# Patient Record
Sex: FEMALE | Race: WHITE | NOT HISPANIC OR LATINO | Employment: FULL TIME | URBAN - METROPOLITAN AREA
[De-identification: names, ages, dates, MRNs, and addresses within clinical notes are randomized per-mention and may not be internally consistent; named-entity substitution may affect disease eponyms.]

---

## 2018-03-22 ENCOUNTER — APPOINTMENT (OUTPATIENT)
Dept: WOMENS IMAGING | Facility: HOSPITAL | Age: 58
End: 2018-03-22

## 2018-03-22 PROCEDURE — 77067 SCR MAMMO BI INCL CAD: CPT | Performed by: RADIOLOGY

## 2018-03-22 PROCEDURE — 77063 BREAST TOMOSYNTHESIS BI: CPT | Performed by: RADIOLOGY

## 2020-03-11 ENCOUNTER — APPOINTMENT (OUTPATIENT)
Dept: WOMENS IMAGING | Facility: HOSPITAL | Age: 60
End: 2020-03-11

## 2020-03-11 PROCEDURE — 77067 SCR MAMMO BI INCL CAD: CPT | Performed by: RADIOLOGY

## 2020-03-11 PROCEDURE — 77063 BREAST TOMOSYNTHESIS BI: CPT | Performed by: RADIOLOGY

## 2021-03-03 ENCOUNTER — OFFICE VISIT (OUTPATIENT)
Dept: NEUROSURGERY | Facility: CLINIC | Age: 61
End: 2021-03-03

## 2021-03-03 VITALS — HEIGHT: 67 IN | WEIGHT: 212 LBS | BODY MASS INDEX: 33.27 KG/M2 | TEMPERATURE: 97.3 F

## 2021-03-03 DIAGNOSIS — M51.36 DDD (DEGENERATIVE DISC DISEASE), LUMBAR: ICD-10-CM

## 2021-03-03 DIAGNOSIS — M51.16 LUMBAR DISC HERNIATION WITH RADICULOPATHY: Primary | ICD-10-CM

## 2021-03-03 PROCEDURE — 99204 OFFICE O/P NEW MOD 45 MIN: CPT | Performed by: NEUROLOGICAL SURGERY

## 2021-03-03 RX ORDER — INDOMETHACIN 50 MG/1
50 CAPSULE ORAL 3 TIMES DAILY PRN
COMMUNITY
Start: 2021-02-20 | End: 2021-04-21

## 2021-03-03 RX ORDER — METOPROLOL SUCCINATE 100 MG/1
100 TABLET, EXTENDED RELEASE ORAL DAILY
COMMUNITY
Start: 2021-02-15

## 2021-03-03 RX ORDER — ORPHENADRINE CITRATE 100 MG/1
100 TABLET, EXTENDED RELEASE ORAL 2 TIMES DAILY
COMMUNITY
Start: 2021-02-04

## 2021-03-03 RX ORDER — ASCORBIC ACID 1000 MG
1 TABLET, EXTENDED RELEASE ORAL DAILY
Status: ON HOLD | COMMUNITY
End: 2021-04-01 | Stop reason: SDUPTHER

## 2021-03-03 RX ORDER — ATORVASTATIN CALCIUM 20 MG/1
20 TABLET, FILM COATED ORAL NIGHTLY
COMMUNITY
Start: 2021-02-15

## 2021-03-03 RX ORDER — GABAPENTIN 300 MG/1
CAPSULE ORAL
Qty: 90 CAPSULE | Refills: 0 | Status: SHIPPED | OUTPATIENT
Start: 2021-03-03 | End: 2021-04-21

## 2021-03-03 NOTE — PROGRESS NOTES
Patient: Nain Hutchins  : 1960    Primary Care Provider: Karl Velazquez MD    Requesting Provider: As above        History    Chief Complaint: Back and right leg pain with sensory alteration.    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  She did well.  At the very beginning of this year she developed increased low back pain radiating to her right hip and buttock and then down the leg.  The pain extends into the side and even a little bit into the more anterior aspects of the right leg.  She has no left leg symptoms.  She does have associated numbness and tingling.  Symptoms are better with heat and ice.  She has to frequently change positions to get comfortable.  She does a little better with walking.  She is worse with sitting or standing still.  She has been treated with a cortisone injections in her hip as well as oral steroids.  She has done a home exercise regimen.  She has no bowel or bladder dysfunction.  Of note at the end of December I operated on her daughter's back.    Review of Systems   Constitutional: Positive for activity change. Negative for appetite change, chills, diaphoresis, fatigue, fever and unexpected weight change.   HENT: Negative for congestion, dental problem, drooling, ear discharge, ear pain, facial swelling, hearing loss, mouth sores, nosebleeds, postnasal drip, rhinorrhea, sinus pressure, sinus pain, sneezing, sore throat, tinnitus, trouble swallowing and voice change.    Eyes: Negative for photophobia, pain, discharge, redness, itching and visual disturbance.   Respiratory: Positive for apnea. Negative for cough, choking, chest tightness, shortness of breath, wheezing and stridor.    Cardiovascular: Negative for chest pain, palpitations and leg swelling.   Gastrointestinal: Negative for abdominal distention, abdominal pain, anal bleeding, blood in stool,  "constipation, diarrhea, nausea, rectal pain and vomiting.   Endocrine: Negative for cold intolerance, heat intolerance, polydipsia, polyphagia and polyuria.   Genitourinary: Negative for decreased urine volume, difficulty urinating, dyspareunia, dysuria, enuresis, flank pain, frequency, genital sores, hematuria, menstrual problem, pelvic pain, urgency, vaginal bleeding, vaginal discharge and vaginal pain.   Musculoskeletal: Positive for back pain. Negative for arthralgias, gait problem, joint swelling, myalgias, neck pain and neck stiffness.   Skin: Negative for color change, pallor, rash and wound.   Allergic/Immunologic: Negative for environmental allergies, food allergies and immunocompromised state.   Neurological: Negative for dizziness, tremors, seizures, syncope, facial asymmetry, speech difficulty, weakness, light-headedness, numbness and headaches.   Hematological: Negative for adenopathy. Does not bruise/bleed easily.   Psychiatric/Behavioral: Negative for agitation, behavioral problems, confusion, decreased concentration, dysphoric mood, hallucinations, self-injury, sleep disturbance and suicidal ideas. The patient is not nervous/anxious and is not hyperactive.        The patient's past medical history, past surgical history, family history, and social history have been reviewed at length in the electronic medical record.    Physical Exam:   Temp 97.3 °F (36.3 °C)   Ht 170.2 cm (67\")   Wt 96.2 kg (212 lb)   BMI 33.20 kg/m²   CONSTITUTIONAL: Patient is well-nourished, pleasant and appears stated age.  CV: Heart regular rate and rhythm without murmur, rub, or gallop.  PULMONARY: Lungs are clear to ascultation.  MUSCULOSKELETAL:  Straight leg raising is negative.  Micky's Sign is negative.  ROM in back normal.  Tenderness in the back to palpation is not observed.  NEUROLOGICAL:  Orientation, memory, attention span, language function, and cognition have been examined and are intact.  Strength is intact " in the lower extremities to direct testing.  Muscle tone is normal throughout.  Station and gait are normal.  Sensation is intact to light touch testing throughout.  Deep tendon reflexes are 2+ and symmetrical except the right ankle reflex which is absent  Coordination is intact.      Medical Decision Making    Data Review:   MRI of the lumbar spine dated 2/10/2021 demonstrates some diffuse degenerative disc disease.  There is disc extrusion into the recess and proximal foramen on the right at L4-5 that is the likely source for her symptoms.  Laminotomy defect with some granulation tissue is noted on the right at L5-S1.    Diagnosis:   Right L4/5 radiculopathy secondary to disc herniation.    Treatment Options:   The patient is awaiting a call to set up an epidural injection with Dr. Osborn.  I am going to start her on physical therapy and Neurontin.  She will follow-up with me in approximately 2.5 weeks.  If she is not doing better then we will push ahead with right L4-5 discectomy.  I have explained what that procedure would entail as well as the potential risks, complications, limitations.  If her symptoms worsen in the interim then she will contact me and we will push ahead with surgery.  She is to be off work until follow-up.  Her  was present via speaker phone for our discussion.       Diagnosis Plan   1. Lumbar disc herniation with radiculopathy  Ambulatory Referral to Physical Therapy Evaluate and treat; Stretching, ROM, Strengthening    gabapentin (NEURONTIN) 300 MG capsule   2. DDD (degenerative disc disease), lumbar         Scribed for Kit Castelan MD by LAURI Light 3/3/2021 14:46 EST      I, Dr. Castelan, personally performed the services described in the documentation, as scribed in my presence, and it is both accurate and complete.

## 2021-03-05 ENCOUNTER — TELEPHONE (OUTPATIENT)
Dept: NEUROSURGERY | Facility: CLINIC | Age: 61
End: 2021-03-05

## 2021-03-05 NOTE — TELEPHONE ENCOUNTER
Yes, it is very common to be taking an NSAID, muscle relaxer and gabapentin all at one.   - each one achieves something different

## 2021-03-05 NOTE — TELEPHONE ENCOUNTER
Provider:  Kit Castelan  Caller: Nain cerda  Time of call:8:55    Phone #:  [phone]  Surgery: na   Surgery Date: na   Last visit: 3-3-21    Next visit: 3-24-21    MARIE:         Reason for call:    Patient wanted to know if she should be taking the indomethacin with the orphenadrine together while she is taking the gabapentin. Please call her back. This is for her back pain .

## 2021-03-24 ENCOUNTER — OFFICE VISIT (OUTPATIENT)
Dept: NEUROSURGERY | Facility: CLINIC | Age: 61
End: 2021-03-24

## 2021-03-24 VITALS — TEMPERATURE: 97 F | RESPIRATION RATE: 15 BRPM | HEIGHT: 67 IN | BODY MASS INDEX: 33.9 KG/M2 | WEIGHT: 216 LBS

## 2021-03-24 DIAGNOSIS — M51.36 DDD (DEGENERATIVE DISC DISEASE), LUMBAR: ICD-10-CM

## 2021-03-24 DIAGNOSIS — M51.16 LUMBAR DISC HERNIATION WITH RADICULOPATHY: Primary | ICD-10-CM

## 2021-03-24 PROCEDURE — 99214 OFFICE O/P EST MOD 30 MIN: CPT | Performed by: NEUROLOGICAL SURGERY

## 2021-03-24 RX ORDER — HYDROCODONE BITARTRATE AND ACETAMINOPHEN 5; 325 MG/1; MG/1
1 TABLET ORAL EVERY 6 HOURS PRN
COMMUNITY
Start: 2021-02-24 | End: 2021-03-24

## 2021-03-24 NOTE — PROGRESS NOTES
Patient: Nain Hutchins  : 1960    Primary Care Provider: Karl Velazquez MD    Requesting Provider: As above        History    Chief Complaint: Back and right leg pain with sensory alteration.    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  She did well.  At the very beginning of this year she developed increased low back pain radiating to her right hip and buttock and then down the leg.  The pain extends into the side and even a little bit into the more anterior aspects of the right leg.  She has no left leg symptoms.  She does have associated numbness and tingling.  Symptoms are better with heat and ice.  She has to frequently change positions to get comfortable.  She does a little better with walking.  She is worse with sitting or standing still.  She has been treated with a cortisone injections in her hip as well as oral steroids.  She has done a home exercise regimen.  She has no bowel or bladder dysfunction.  Of note at the end of December I operated on her daughter's back.  She has done some more therapy and it made her symptoms worse.  She has not had an epidural injection performed as of yet.    Review of Systems   Constitutional: Negative for activity change, appetite change, chills, diaphoresis, fatigue, fever and unexpected weight change.   HENT: Negative for congestion, dental problem, drooling, ear discharge, ear pain, facial swelling, hearing loss, mouth sores, nosebleeds, postnasal drip, rhinorrhea, sinus pressure, sneezing, sore throat, tinnitus, trouble swallowing and voice change.    Eyes: Negative for photophobia, pain, discharge, redness, itching and visual disturbance.   Respiratory: Negative for apnea, cough, choking, chest tightness, shortness of breath, wheezing and stridor.    Cardiovascular: Negative for chest pain, palpitations and leg swelling.   Gastrointestinal:  "Negative for abdominal distention, abdominal pain, anal bleeding, blood in stool, constipation, diarrhea, nausea, rectal pain and vomiting.   Endocrine: Negative for cold intolerance, heat intolerance, polydipsia, polyphagia and polyuria.   Genitourinary: Negative for decreased urine volume, difficulty urinating, dysuria, enuresis, flank pain, frequency, genital sores, hematuria and urgency.   Musculoskeletal: Negative for arthralgias, back pain, gait problem, joint swelling, myalgias, neck pain and neck stiffness.   Skin: Negative for color change, pallor, rash and wound.   Allergic/Immunologic: Negative for environmental allergies, food allergies and immunocompromised state.   Neurological: Negative for dizziness, tremors, seizures, syncope, facial asymmetry, speech difficulty, weakness, light-headedness, numbness and headaches.   Hematological: Negative for adenopathy. Does not bruise/bleed easily.   Psychiatric/Behavioral: Negative for agitation, behavioral problems, confusion, decreased concentration, dysphoric mood, hallucinations, self-injury, sleep disturbance and suicidal ideas. The patient is not nervous/anxious and is not hyperactive.    All other systems reviewed and are negative.      The patient's past medical history, past surgical history, family history, and social history have been reviewed at length in the electronic medical record.    Physical Exam:   Temp 97 °F (36.1 °C) (Infrared)   Resp 15   Ht 170.2 cm (67\")   Wt 98 kg (216 lb)   BMI 33.83 kg/m²   MUSCULOSKELETAL:  Straight leg raising is negative.  Micky's Sign is negative.  ROM in back is normal.  Tenderness in the back to palpation is not observed.  NEUROLOGICAL:  Strength is intact in the lower extremities to direct testing.  Muscle tone is normal throughout.  Station and gait are normal.  Sensation is intact to light touch testing throughout.        Medical Decision Making    Data Review:   MRI of the lumbar spine dated 2/10/2021 " demonstrates some diffuse degenerative disc disease.  There is disc extrusion into the recess and proximal foramen on the right at L4-5 that is the likely source for her symptoms.  Laminotomy defect with some granulation tissue is noted on the right at L5-S1.  There is some central to left-sided prominent disc bulging at L3-4.    Diagnosis:   Right L4-5 radiculopathy secondary to disc protrusion.    Treatment Options:   The patient is struggling and as such I recommended right L4-5 discectomy.  The nature of the procedure as well as the potential risks, complications, limitations, and alternatives to the procedure were discussed at length with the patient and the patient has agreed to proceed with surgery.       Diagnosis Plan   1. Lumbar disc herniation with radiculopathy     2. DDD (degenerative disc disease), lumbar         Scribed for Kit Castelan MD by Darlene Mooney CMA on 3/24/2021 14:37 EDT       I, Dr. Castelan, personally performed the services described in the documentation, as scribed in my presence, and it is both accurate and complete.

## 2021-03-25 ENCOUNTER — PREP FOR SURGERY (OUTPATIENT)
Dept: OTHER | Facility: HOSPITAL | Age: 61
End: 2021-03-25

## 2021-03-25 DIAGNOSIS — M54.31 SCIATICA OF RIGHT SIDE: Primary | ICD-10-CM

## 2021-03-25 RX ORDER — SODIUM CHLORIDE, SODIUM LACTATE, POTASSIUM CHLORIDE, CALCIUM CHLORIDE 600; 310; 30; 20 MG/100ML; MG/100ML; MG/100ML; MG/100ML
9 INJECTION, SOLUTION INTRAVENOUS CONTINUOUS
Status: CANCELLED | OUTPATIENT
Start: 2021-03-25

## 2021-03-25 RX ORDER — CEFAZOLIN SODIUM 2 G/100ML
2 INJECTION, SOLUTION INTRAVENOUS ONCE
Status: CANCELLED | OUTPATIENT
Start: 2021-03-25 | End: 2021-03-25

## 2021-03-25 RX ORDER — CHLORHEXIDINE GLUCONATE 4 G/100ML
SOLUTION TOPICAL
Qty: 120 ML | Refills: 0 | Status: ON HOLD | OUTPATIENT
Start: 2021-03-25 | End: 2021-04-01 | Stop reason: SDUPTHER

## 2021-03-25 RX ORDER — FAMOTIDINE 20 MG/1
20 TABLET, FILM COATED ORAL
Status: CANCELLED | OUTPATIENT
Start: 2021-03-25

## 2021-03-30 ENCOUNTER — APPOINTMENT (OUTPATIENT)
Dept: PREADMISSION TESTING | Facility: HOSPITAL | Age: 61
End: 2021-03-30

## 2021-03-30 VITALS — WEIGHT: 216.27 LBS | BODY MASS INDEX: 32.03 KG/M2 | HEIGHT: 69 IN

## 2021-03-30 DIAGNOSIS — M54.31 SCIATICA OF RIGHT SIDE: ICD-10-CM

## 2021-03-30 LAB
ANION GAP SERPL CALCULATED.3IONS-SCNC: 10 MMOL/L (ref 5–15)
BUN SERPL-MCNC: 14 MG/DL (ref 6–20)
BUN/CREAT SERPL: 16.3 (ref 7–25)
CALCIUM SPEC-SCNC: 9.5 MG/DL (ref 8.6–10.5)
CHLORIDE SERPL-SCNC: 106 MMOL/L (ref 98–107)
CO2 SERPL-SCNC: 26 MMOL/L (ref 22–29)
CREAT SERPL-MCNC: 0.86 MG/DL (ref 0.57–1)
DEPRECATED RDW RBC AUTO: 45.6 FL (ref 37–54)
ERYTHROCYTE [DISTWIDTH] IN BLOOD BY AUTOMATED COUNT: 15.4 % (ref 12.3–15.4)
GFR SERPL CREATININE-BSD FRML MDRD: 68 ML/MIN/1.73
GLUCOSE SERPL-MCNC: 95 MG/DL (ref 65–99)
HCT VFR BLD AUTO: 39.6 % (ref 34–46.6)
HGB BLD-MCNC: 12.2 G/DL (ref 12–15.9)
MCH RBC QN AUTO: 25.2 PG (ref 26.6–33)
MCHC RBC AUTO-ENTMCNC: 30.8 G/DL (ref 31.5–35.7)
MCV RBC AUTO: 81.8 FL (ref 79–97)
MRSA DNA SPEC QL NAA+PROBE: NEGATIVE
PLATELET # BLD AUTO: 312 10*3/MM3 (ref 140–450)
PMV BLD AUTO: 10.1 FL (ref 6–12)
POTASSIUM SERPL-SCNC: 5.1 MMOL/L (ref 3.5–5.2)
QT INTERVAL: 404 MS
QTC INTERVAL: 429 MS
RBC # BLD AUTO: 4.84 10*6/MM3 (ref 3.77–5.28)
SODIUM SERPL-SCNC: 142 MMOL/L (ref 136–145)
WBC # BLD AUTO: 6.88 10*3/MM3 (ref 3.4–10.8)

## 2021-03-30 PROCEDURE — U0004 COV-19 TEST NON-CDC HGH THRU: HCPCS

## 2021-03-30 PROCEDURE — 93005 ELECTROCARDIOGRAM TRACING: CPT

## 2021-03-30 PROCEDURE — 87641 MR-STAPH DNA AMP PROBE: CPT

## 2021-03-30 PROCEDURE — 36415 COLL VENOUS BLD VENIPUNCTURE: CPT

## 2021-03-30 PROCEDURE — 85027 COMPLETE CBC AUTOMATED: CPT

## 2021-03-30 PROCEDURE — C9803 HOPD COVID-19 SPEC COLLECT: HCPCS

## 2021-03-30 PROCEDURE — 80048 BASIC METABOLIC PNL TOTAL CA: CPT

## 2021-03-30 PROCEDURE — 93010 ELECTROCARDIOGRAM REPORT: CPT | Performed by: INTERNAL MEDICINE

## 2021-03-31 ENCOUNTER — ANESTHESIA EVENT (OUTPATIENT)
Dept: PERIOP | Facility: HOSPITAL | Age: 61
End: 2021-03-31

## 2021-03-31 LAB
REF LAB TEST METHOD: NORMAL
SARS-COV-2 RNA RESP QL NAA+PROBE: NOT DETECTED

## 2021-03-31 RX ORDER — FAMOTIDINE 10 MG/ML
20 INJECTION, SOLUTION INTRAVENOUS ONCE
Status: CANCELLED | OUTPATIENT
Start: 2021-03-31 | End: 2021-03-31

## 2021-04-01 ENCOUNTER — HOSPITAL ENCOUNTER (OUTPATIENT)
Facility: HOSPITAL | Age: 61
Setting detail: HOSPITAL OUTPATIENT SURGERY
Discharge: HOME | End: 2021-04-01
Attending: NEUROLOGICAL SURGERY | Admitting: NEUROLOGICAL SURGERY

## 2021-04-01 ENCOUNTER — ANESTHESIA (OUTPATIENT)
Dept: PERIOP | Facility: HOSPITAL | Age: 61
End: 2021-04-01

## 2021-04-01 ENCOUNTER — APPOINTMENT (OUTPATIENT)
Dept: GENERAL RADIOLOGY | Facility: HOSPITAL | Age: 61
End: 2021-04-01

## 2021-04-01 VITALS
SYSTOLIC BLOOD PRESSURE: 164 MMHG | WEIGHT: 216 LBS | HEART RATE: 79 BPM | BODY MASS INDEX: 31.99 KG/M2 | RESPIRATION RATE: 20 BRPM | OXYGEN SATURATION: 93 % | TEMPERATURE: 98.5 F | HEIGHT: 69 IN | DIASTOLIC BLOOD PRESSURE: 68 MMHG

## 2021-04-01 DIAGNOSIS — M54.31 SCIATICA OF RIGHT SIDE: Primary | ICD-10-CM

## 2021-04-01 LAB — GLUCOSE BLDC GLUCOMTR-MCNC: 109 MG/DL (ref 70–130)

## 2021-04-01 PROCEDURE — 25010000002 DEXAMETHASONE PER 1 MG: Performed by: NURSE ANESTHETIST, CERTIFIED REGISTERED

## 2021-04-01 PROCEDURE — 25010000002 FENTANYL CITRATE (PF) 100 MCG/2ML SOLUTION: Performed by: ANESTHESIOLOGY

## 2021-04-01 PROCEDURE — 25010000002 NEOSTIGMINE 10 MG/10ML SOLUTION: Performed by: NURSE ANESTHETIST, CERTIFIED REGISTERED

## 2021-04-01 PROCEDURE — 25010000002 ONDANSETRON PER 1 MG: Performed by: NURSE ANESTHETIST, CERTIFIED REGISTERED

## 2021-04-01 PROCEDURE — 25010000002 FENTANYL CITRATE (PF) 100 MCG/2ML SOLUTION: Performed by: NURSE ANESTHETIST, CERTIFIED REGISTERED

## 2021-04-01 PROCEDURE — 25010000002 PROPOFOL 10 MG/ML EMULSION: Performed by: NURSE ANESTHETIST, CERTIFIED REGISTERED

## 2021-04-01 PROCEDURE — 25010000003 CEFAZOLIN IN DEXTROSE 2-4 GM/100ML-% SOLUTION: Performed by: PHYSICIAN ASSISTANT

## 2021-04-01 PROCEDURE — 63030 LAMOT DCMPRN NRV RT 1 LMBR: CPT | Performed by: NEUROLOGICAL SURGERY

## 2021-04-01 PROCEDURE — 82962 GLUCOSE BLOOD TEST: CPT

## 2021-04-01 PROCEDURE — 76000 FLUOROSCOPY <1 HR PHYS/QHP: CPT

## 2021-04-01 PROCEDURE — 63030 LAMOT DCMPRN NRV RT 1 LMBR: CPT | Performed by: PHYSICIAN ASSISTANT

## 2021-04-01 PROCEDURE — 25010000002 METHYLPREDNISOLONE PER 40 MG: Performed by: NEUROLOGICAL SURGERY

## 2021-04-01 DEVICE — FLOSEAL HEMOSTATIC MATRIX, 10ML
Type: IMPLANTABLE DEVICE | Site: SPINE LUMBAR | Status: FUNCTIONAL
Brand: FLOSEAL HEMOSTATIC MATRIX

## 2021-04-01 DEVICE — IMPLANTABLE DEVICE
Type: IMPLANTABLE DEVICE | Site: SPINE LUMBAR | Status: FUNCTIONAL
Brand: SURGIFOAM® ABSORBABLE GELATIN SPONGE, U.S.P.

## 2021-04-01 RX ORDER — SODIUM CHLORIDE 0.9 % (FLUSH) 0.9 %
10 SYRINGE (ML) INJECTION AS NEEDED
Status: DISCONTINUED | OUTPATIENT
Start: 2021-04-01 | End: 2021-04-01 | Stop reason: HOSPADM

## 2021-04-01 RX ORDER — MULTIVIT WITH MINERALS/LUTEIN
1000 TABLET ORAL DAILY
COMMUNITY

## 2021-04-01 RX ORDER — DROPERIDOL 2.5 MG/ML
0.62 INJECTION, SOLUTION INTRAMUSCULAR; INTRAVENOUS ONCE AS NEEDED
Status: DISCONTINUED | OUTPATIENT
Start: 2021-04-01 | End: 2021-04-01 | Stop reason: HOSPADM

## 2021-04-01 RX ORDER — ROCURONIUM BROMIDE 10 MG/ML
INJECTION, SOLUTION INTRAVENOUS AS NEEDED
Status: DISCONTINUED | OUTPATIENT
Start: 2021-04-01 | End: 2021-04-01 | Stop reason: SURG

## 2021-04-01 RX ORDER — MIDAZOLAM HYDROCHLORIDE 1 MG/ML
2 INJECTION INTRAMUSCULAR; INTRAVENOUS
Status: DISCONTINUED | OUTPATIENT
Start: 2021-04-01 | End: 2021-04-01 | Stop reason: HOSPADM

## 2021-04-01 RX ORDER — ONDANSETRON 2 MG/ML
INJECTION INTRAMUSCULAR; INTRAVENOUS AS NEEDED
Status: DISCONTINUED | OUTPATIENT
Start: 2021-04-01 | End: 2021-04-01 | Stop reason: SURG

## 2021-04-01 RX ORDER — ONDANSETRON 2 MG/ML
4 INJECTION INTRAMUSCULAR; INTRAVENOUS ONCE AS NEEDED
Status: DISCONTINUED | OUTPATIENT
Start: 2021-04-01 | End: 2021-04-01 | Stop reason: HOSPADM

## 2021-04-01 RX ORDER — PROMETHAZINE HYDROCHLORIDE 25 MG/1
25 TABLET ORAL ONCE AS NEEDED
Status: DISCONTINUED | OUTPATIENT
Start: 2021-04-01 | End: 2021-04-01 | Stop reason: HOSPADM

## 2021-04-01 RX ORDER — IPRATROPIUM BROMIDE AND ALBUTEROL SULFATE 2.5; .5 MG/3ML; MG/3ML
3 SOLUTION RESPIRATORY (INHALATION) ONCE AS NEEDED
Status: DISCONTINUED | OUTPATIENT
Start: 2021-04-01 | End: 2021-04-01 | Stop reason: HOSPADM

## 2021-04-01 RX ORDER — FAMOTIDINE 20 MG/1
20 TABLET, FILM COATED ORAL ONCE
Status: COMPLETED | OUTPATIENT
Start: 2021-04-01 | End: 2021-04-01

## 2021-04-01 RX ORDER — PROPOFOL 10 MG/ML
VIAL (ML) INTRAVENOUS AS NEEDED
Status: DISCONTINUED | OUTPATIENT
Start: 2021-04-01 | End: 2021-04-01 | Stop reason: SURG

## 2021-04-01 RX ORDER — METHYLPREDNISOLONE ACETATE 40 MG/ML
INJECTION, SUSPENSION INTRA-ARTICULAR; INTRALESIONAL; INTRAMUSCULAR; SOFT TISSUE AS NEEDED
Status: DISCONTINUED | OUTPATIENT
Start: 2021-04-01 | End: 2021-04-01 | Stop reason: HOSPADM

## 2021-04-01 RX ORDER — HYDROMORPHONE HYDROCHLORIDE 1 MG/ML
0.5 INJECTION, SOLUTION INTRAMUSCULAR; INTRAVENOUS; SUBCUTANEOUS
Status: DISCONTINUED | OUTPATIENT
Start: 2021-04-01 | End: 2021-04-01 | Stop reason: HOSPADM

## 2021-04-01 RX ORDER — MAGNESIUM HYDROXIDE 1200 MG/15ML
LIQUID ORAL AS NEEDED
Status: DISCONTINUED | OUTPATIENT
Start: 2021-04-01 | End: 2021-04-01 | Stop reason: HOSPADM

## 2021-04-01 RX ORDER — FENTANYL CITRATE 50 UG/ML
50 INJECTION, SOLUTION INTRAMUSCULAR; INTRAVENOUS
Status: DISCONTINUED | OUTPATIENT
Start: 2021-04-01 | End: 2021-04-01 | Stop reason: HOSPADM

## 2021-04-01 RX ORDER — GLYCOPYRROLATE 0.2 MG/ML
INJECTION INTRAMUSCULAR; INTRAVENOUS AS NEEDED
Status: DISCONTINUED | OUTPATIENT
Start: 2021-04-01 | End: 2021-04-01 | Stop reason: SURG

## 2021-04-01 RX ORDER — LABETALOL HYDROCHLORIDE 5 MG/ML
5 INJECTION, SOLUTION INTRAVENOUS
Status: DISCONTINUED | OUTPATIENT
Start: 2021-04-01 | End: 2021-04-01 | Stop reason: HOSPADM

## 2021-04-01 RX ORDER — OXYCODONE HYDROCHLORIDE AND ACETAMINOPHEN 5; 325 MG/1; MG/1
1 TABLET ORAL 3 TIMES DAILY PRN
Qty: 20 TABLET | Refills: 0 | Status: SHIPPED | OUTPATIENT
Start: 2021-04-01 | End: 2021-04-21

## 2021-04-01 RX ORDER — SCOLOPAMINE TRANSDERMAL SYSTEM 1 MG/1
1 PATCH, EXTENDED RELEASE TRANSDERMAL ONCE
Status: DISCONTINUED | OUTPATIENT
Start: 2021-04-01 | End: 2021-04-01 | Stop reason: HOSPADM

## 2021-04-01 RX ORDER — FENTANYL CITRATE 50 UG/ML
100 INJECTION, SOLUTION INTRAMUSCULAR; INTRAVENOUS
Status: DISCONTINUED | OUTPATIENT
Start: 2021-04-01 | End: 2021-04-01 | Stop reason: HOSPADM

## 2021-04-01 RX ORDER — DEXAMETHASONE SODIUM PHOSPHATE 4 MG/ML
INJECTION, SOLUTION INTRA-ARTICULAR; INTRALESIONAL; INTRAMUSCULAR; INTRAVENOUS; SOFT TISSUE AS NEEDED
Status: DISCONTINUED | OUTPATIENT
Start: 2021-04-01 | End: 2021-04-01 | Stop reason: SURG

## 2021-04-01 RX ORDER — NEOSTIGMINE METHYLSULFATE 1 MG/ML
INJECTION, SOLUTION INTRAVENOUS AS NEEDED
Status: DISCONTINUED | OUTPATIENT
Start: 2021-04-01 | End: 2021-04-01 | Stop reason: SURG

## 2021-04-01 RX ORDER — CEFAZOLIN SODIUM 2 G/100ML
2 INJECTION, SOLUTION INTRAVENOUS ONCE
Status: COMPLETED | OUTPATIENT
Start: 2021-04-01 | End: 2021-04-01

## 2021-04-01 RX ORDER — SODIUM CHLORIDE, SODIUM LACTATE, POTASSIUM CHLORIDE, CALCIUM CHLORIDE 600; 310; 30; 20 MG/100ML; MG/100ML; MG/100ML; MG/100ML
9 INJECTION, SOLUTION INTRAVENOUS CONTINUOUS
Status: DISCONTINUED | OUTPATIENT
Start: 2021-04-01 | End: 2021-04-01 | Stop reason: HOSPADM

## 2021-04-01 RX ORDER — BUPIVACAINE HYDROCHLORIDE AND EPINEPHRINE 2.5; 5 MG/ML; UG/ML
INJECTION, SOLUTION EPIDURAL; INFILTRATION; INTRACAUDAL; PERINEURAL AS NEEDED
Status: DISCONTINUED | OUTPATIENT
Start: 2021-04-01 | End: 2021-04-01 | Stop reason: HOSPADM

## 2021-04-01 RX ORDER — PROMETHAZINE HYDROCHLORIDE 25 MG/1
25 SUPPOSITORY RECTAL ONCE AS NEEDED
Status: DISCONTINUED | OUTPATIENT
Start: 2021-04-01 | End: 2021-04-01 | Stop reason: HOSPADM

## 2021-04-01 RX ORDER — SODIUM CHLORIDE 0.9 % (FLUSH) 0.9 %
10 SYRINGE (ML) INJECTION EVERY 12 HOURS SCHEDULED
Status: DISCONTINUED | OUTPATIENT
Start: 2021-04-01 | End: 2021-04-01 | Stop reason: HOSPADM

## 2021-04-01 RX ORDER — LIDOCAINE HYDROCHLORIDE 10 MG/ML
0.5 INJECTION, SOLUTION EPIDURAL; INFILTRATION; INTRACAUDAL; PERINEURAL ONCE AS NEEDED
Status: COMPLETED | OUTPATIENT
Start: 2021-04-01 | End: 2021-04-01

## 2021-04-01 RX ORDER — FENTANYL CITRATE 50 UG/ML
INJECTION, SOLUTION INTRAMUSCULAR; INTRAVENOUS AS NEEDED
Status: DISCONTINUED | OUTPATIENT
Start: 2021-04-01 | End: 2021-04-01 | Stop reason: SURG

## 2021-04-01 RX ORDER — MIDAZOLAM HYDROCHLORIDE 1 MG/ML
1 INJECTION INTRAMUSCULAR; INTRAVENOUS
Status: DISCONTINUED | OUTPATIENT
Start: 2021-04-01 | End: 2021-04-01 | Stop reason: HOSPADM

## 2021-04-01 RX ORDER — LIDOCAINE HYDROCHLORIDE 10 MG/ML
INJECTION, SOLUTION EPIDURAL; INFILTRATION; INTRACAUDAL; PERINEURAL AS NEEDED
Status: DISCONTINUED | OUTPATIENT
Start: 2021-04-01 | End: 2021-04-01 | Stop reason: SURG

## 2021-04-01 RX ORDER — METOPROLOL SUCCINATE 50 MG/1
100 TABLET, EXTENDED RELEASE ORAL ONCE
Status: COMPLETED | OUTPATIENT
Start: 2021-04-01 | End: 2021-04-01

## 2021-04-01 RX ADMIN — SODIUM CHLORIDE, POTASSIUM CHLORIDE, SODIUM LACTATE AND CALCIUM CHLORIDE 9 ML/HR: 600; 310; 30; 20 INJECTION, SOLUTION INTRAVENOUS at 10:36

## 2021-04-01 RX ADMIN — GLYCOPYRROLATE 0.4 MG: 0.4 INJECTION INTRAMUSCULAR; INTRAVENOUS at 13:38

## 2021-04-01 RX ADMIN — LIDOCAINE HYDROCHLORIDE 0.2 ML: 10 INJECTION, SOLUTION EPIDURAL; INFILTRATION; INTRACAUDAL; PERINEURAL at 10:36

## 2021-04-01 RX ADMIN — PROPOFOL 200 MG: 10 INJECTION, EMULSION INTRAVENOUS at 12:46

## 2021-04-01 RX ADMIN — METOPROLOL SUCCINATE 100 MG: 50 TABLET, EXTENDED RELEASE ORAL at 11:02

## 2021-04-01 RX ADMIN — SCOPALAMINE 1 PATCH: 1 PATCH, EXTENDED RELEASE TRANSDERMAL at 11:01

## 2021-04-01 RX ADMIN — FENTANYL CITRATE 50 MCG: 50 INJECTION, SOLUTION INTRAMUSCULAR; INTRAVENOUS at 12:46

## 2021-04-01 RX ADMIN — NEOSTIGMINE 3 MG: 1 INJECTION INTRAVENOUS at 13:38

## 2021-04-01 RX ADMIN — FENTANYL CITRATE 100 MCG: 50 INJECTION, SOLUTION INTRAMUSCULAR; INTRAVENOUS at 12:28

## 2021-04-01 RX ADMIN — CEFAZOLIN SODIUM 2 G: 2 INJECTION, SOLUTION INTRAVENOUS at 12:40

## 2021-04-01 RX ADMIN — FENTANYL CITRATE 50 MCG: 50 INJECTION, SOLUTION INTRAMUSCULAR; INTRAVENOUS at 13:41

## 2021-04-01 RX ADMIN — FAMOTIDINE 20 MG: 20 TABLET, FILM COATED ORAL at 11:01

## 2021-04-01 RX ADMIN — ONDANSETRON 4 MG: 2 INJECTION INTRAMUSCULAR; INTRAVENOUS at 13:38

## 2021-04-01 RX ADMIN — DEXAMETHASONE SODIUM PHOSPHATE 8 MG: 4 INJECTION, SOLUTION INTRA-ARTICULAR; INTRALESIONAL; INTRAMUSCULAR; INTRAVENOUS; SOFT TISSUE at 12:51

## 2021-04-01 RX ADMIN — ROCURONIUM BROMIDE 40 MG: 10 INJECTION INTRAVENOUS at 12:46

## 2021-04-01 RX ADMIN — LIDOCAINE HYDROCHLORIDE 50 MG: 10 INJECTION, SOLUTION EPIDURAL; INFILTRATION; INTRACAUDAL; PERINEURAL at 12:46

## 2021-04-01 NOTE — ANESTHESIA POSTPROCEDURE EVALUATION
Patient: Nain Hutchins    Procedure Summary     Date: 04/01/21 Room / Location:  [facility]  [facility]    Anesthesia Start: 1240 Anesthesia Stop: 1352    Procedure: LUMBAR DISCECTOMY L4-5 (Right Spine Lumbar) Diagnosis:       Sciatica of right side      (Sciatica of right side [M54.31])    Surgeons: Kit Castelan MD Provider: Cale Santoyo MD    Anesthesia Type: general ASA Status: 3          Anesthesia Type: general    Vitals  Vitals Value Taken Time   /76 04/01/21 1352   Temp 98.8 °F (37.1 °C) 04/01/21 1352   Pulse 91 04/01/21 1352   Resp 14 04/01/21 1352   SpO2 92 % 04/01/21 1352           Post Anesthesia Care and Evaluation    Patient location during evaluation: PACU  Patient participation: complete - patient participated  Level of consciousness: awake and alert  Pain management: adequate  Airway patency: patent  Anesthetic complications: No anesthetic complications  PONV Status: none  Cardiovascular status: hemodynamically stable and acceptable  Respiratory status: nonlabored ventilation, acceptable and nasal cannula  Hydration status: acceptable

## 2021-04-01 NOTE — INTERVAL H&P NOTE
"[facility] Pre-op    Full history and physical note from office is attached.    /86 (BP Location: Right arm, Patient Position: Lying)   Pulse 78   Temp 96.6 °F (35.9 °C) (Temporal)   Resp 18   Ht 175.3 cm (69\")   Wt 98 kg (216 lb)   SpO2 94%   BMI 31.90 kg/m²     Immunizations:  Influenza:  No  Pneumococcal:  No  Tetanus:  No  Covid : no    LAB Results:  Lab Results   Component Value Date    WBC 6.88 03/30/2021    HGB 12.2 03/30/2021    HCT 39.6 03/30/2021    MCV 81.8 03/30/2021     03/30/2021    GLUCOSE 95 03/30/2021    BUN 14 03/30/2021    CREATININE 0.86 03/30/2021    EGFRIFNONA 68 03/30/2021     03/30/2021    K 5.1 03/30/2021     03/30/2021    CO2 26.0 03/30/2021    CALCIUM 9.5 03/30/2021       Cancer Staging (if applicable)  Cancer Patient: __ yes __no __unknown__N/A; If yes, clinical stage T:__ N:__M:__, stage group or __N/A      Impression: Sciatica of right side      Plan: LUMBAR DISCECTOMY L4-5      TALIB Castro   4/1/2021   11:14 EDT   "

## 2021-04-01 NOTE — OP NOTE
NEUROSURGICAL OPERATIVE NOTE        PREOPERATIVE DIAGNOSIS:    Right L4-5 disc herniation      POSTOPERATIVE DIAGNOSIS:  Right L4-5 disc herniation with recess and foraminal stenosis      PROCEDURE:  Right L4-5 laminotomy, medial facetectomy, foraminotomy, and discectomy      SURGEON:  Kit Castelan M.D.      ASSISTANT: Rupali Dubose PA-C    PAC assisted with:   Suctioning   Retraction   Tying   Suturing   Closing   Application of dressing   Skilled neurosurgery PA assistance was necessary to perform this procedure.        ANESTHESIA:  General      ESTIMATED BLOOD LOSS: Minimal      SPECIMEN: None      DRAINS: None      COMPLICATIONS:  None      CLINICAL NOTE:  The patient is a 60-year-old woman with a history of severe back and right leg pain that has been unremitting.  Studies demonstrate disc protrusion rightward at L4-5 that provides clinical correlation.  As such, the patient presents at this time for lumbar discectomy.  The nature of the procedure as well as the potential risks, complications, limitations, and alternatives to the procedure were discussed at length with the patient and the patient has agreed to proceed with surgery.      TECHNICAL NOTE:  The patient was brought to the operating room and while on her cart general endotracheal anesthesia was achieved. She was then turned prone onto the [facility] saddle frame . Special care was ensured to protect pressure points. Her low back was then prepared and draped in the usual fashion. A localizing radiograph was obtained with a spinal needle in the lumbosacral midline. Based on this a 2.5 to 3 cm vertical incision was fashioned overlying the L4-5 interspace. Underlying tissues were divided with cautery to provide exposure to the right L4 and L5 hemilamina. Laminotomy was fashioned. Another radiograph confirmed the operative level. The medial aspect of the facet was undercut. The neural foramen was decompressed with Kerrison punch. The nerve root was  splayed somewhat laterally over a subligamentous disc herniation that had extruded from the narrowed disc space. I incised into this and removed a sizable fragment. There were also some bone spurs along the endplate that I drilled off. I extended the laminotomy upward.  A blunt probe could be passed along the L4 root into the foramen. After removing a small piece of disc and drilling off some spurs, the L5 root was much more mobile and decompressed. The foramen had been decompressed as well with the Kerrison punch. Modest bleeding points were controlled with bipolar cautery. With Valsalva maneuver there was no significant bleeding or evidence of CSF leak. The wound was washed out with a saline solution. A small portion of Gelfoam soaked in a Depo-Medrol solution was placed over the exposed nerve root. The paraspinous muscle and fascia were reapproximated in interrupted fashion with #0 Vicryl suture. Then 0.25% Marcaine was instilled in the paraspinous musculature and subcutaneous tissues. The subcutaneous tissues were closed in layers with 3-0 Vicryl suture. The skin was ultimately closed in a running subcuticular fashion with 3-0 Vicryl suture. A dermal sealant and sterile dressing were applied. There were no overt intraoperative complications.             Kit Castelan M.D.

## 2021-04-01 NOTE — ANESTHESIA PROCEDURE NOTES
Airway  Urgency: elective    Date/Time: 4/1/2021 12:47 PM  Airway not difficult    General Information and Staff    Patient location during procedure: OR  CRNA: Amanda Arias CRNA    Indications and Patient Condition  Indications for airway management: airway protection    Preoxygenated: yes  MILS not maintained throughout  Mask difficulty assessment: 2 - vent by mask + OA or adjuvant +/- NMBA    Final Airway Details  Final airway type: endotracheal airway      Successful airway: ETT  Cuffed: yes   Successful intubation technique: direct laryngoscopy  Facilitating devices/methods: intubating stylet  Endotracheal tube insertion site: oral  Blade: Maira  Blade size: 3  ETT size (mm): 7.0  Cormack-Lehane Classification: grade I - full view of glottis  Placement verified by: chest auscultation and capnometry   Measured from: lips  ETT/EBT  to lips (cm): 21  Number of attempts at approach: 1  Assessment: lips, teeth, and gum same as pre-op and atraumatic intubation    Additional Comments  Negative epigastric sounds, Breath sound equal bilaterally with symmetric chest rise and fall

## 2021-04-01 NOTE — ANESTHESIA PREPROCEDURE EVALUATION
Anesthesia Evaluation     history of anesthetic complications: PONV               Airway   Mallampati: I  TM distance: >3 FB  Neck ROM: full  No difficulty expected  Dental      Pulmonary    (+) sleep apnea,   Cardiovascular     ECG reviewed    (+) hypertension,       Neuro/Psych  (+) numbness,     GI/Hepatic/Renal/Endo    (+) obesity,  GERD,  diabetes mellitus,     Musculoskeletal     Abdominal    Substance History      OB/GYN          Other                        Anesthesia Plan    ASA 3     general     intravenous induction     Anesthetic plan, all risks, benefits, and alternatives have been provided, discussed and informed consent has been obtained with: patient.    Plan discussed with CRNA.

## 2021-04-21 ENCOUNTER — OFFICE VISIT (OUTPATIENT)
Dept: NEUROSURGERY | Facility: CLINIC | Age: 61
End: 2021-04-21

## 2021-04-21 VITALS
BODY MASS INDEX: 33.81 KG/M2 | DIASTOLIC BLOOD PRESSURE: 80 MMHG | TEMPERATURE: 97.1 F | HEIGHT: 67 IN | SYSTOLIC BLOOD PRESSURE: 110 MMHG | WEIGHT: 215.4 LBS

## 2021-04-21 DIAGNOSIS — M51.36 DDD (DEGENERATIVE DISC DISEASE), LUMBAR: ICD-10-CM

## 2021-04-21 DIAGNOSIS — M51.16 LUMBAR DISC HERNIATION WITH RADICULOPATHY: Primary | ICD-10-CM

## 2021-04-21 PROCEDURE — 99024 POSTOP FOLLOW-UP VISIT: CPT | Performed by: PHYSICIAN ASSISTANT

## 2021-04-21 RX ORDER — FLUCONAZOLE 150 MG/1
150 TABLET ORAL
Qty: 2 TABLET | Refills: 0 | Status: SHIPPED | OUTPATIENT
Start: 2021-04-21 | End: 2021-06-25

## 2021-04-21 NOTE — PROGRESS NOTES
Patient: Nain Hutchins  : 1960  Chart #: [id number]    Date of Service: 2021    CHIEF COMPLAINT: []    History of Present Illness       Past Medical History:   Diagnosis Date   • Allergic    • Cancer (CMS/HCC)     Skin   • Diabetes mellitus (CMS/HCC)    • Disease of thyroid gland    • GERD (gastroesophageal reflux disease)    • Hypertension    • Low back pain    • PONV (postoperative nausea and vomiting)    • Sleep apnea     CPAP         Current Outpatient Medications:   •  atorvastatin (LIPITOR) 20 MG tablet, Take 20 mg by mouth Every Night., Disp: , Rfl:   •  Cholecalciferol (Vitamin D3) 25 MCG (1000 UT) capsule, Take 1,000 Units by mouth Daily., Disp: , Rfl:   •  levothyroxine (SYNTHROID, LEVOTHROID) 50 MCG tablet, Take 50 mcg by mouth Daily., Disp: , Rfl:   •  lisinopril (PRINIVIL,ZESTRIL) 40 MG tablet, Take 40 mg by mouth Daily., Disp: , Rfl:   •  metFORMIN ER (GLUCOPHAGE-XR) 500 MG 24 hr tablet, Take 1 tablet by mouth Daily., Disp: 3 tablet, Rfl: 0  •  metoprolol succinate XL (TOPROL-XL) 100 MG 24 hr tablet, Take 100 mg by mouth Daily., Disp: , Rfl:   •  omeprazole (priLOSEC) 40 MG capsule, Take 1 capsule by mouth Daily., Disp: , Rfl:   •  orphenadrine (NORFLEX) 100 MG 12 hr tablet, Take 100 mg by mouth 2 (Two) Times a Day., Disp: , Rfl:   •  vitamin E 1000 UNIT capsule, Take 1,000 Units by mouth Daily., Disp: , Rfl:     Past Surgical History:   Procedure Laterality Date   • BACK SURGERY Right 2009    L5-S1 Laminectomy - Dr. Gamez   • COLONOSCOPY  2018   • KIDNEY STONE SURGERY  2010   • LUMBAR DISCECTOMY Right 2021    Procedure: LUMBAR DISCECTOMY L4-5;  Surgeon: Kit Castelan MD;  Location: [facility];  Service: Neurosurgery;  Laterality: Right;   • ROTATOR CUFF REPAIR Right 2007   • TUBAL ABDOMINAL LIGATION  2000       Social History     Socioeconomic History   • Marital status:      Spouse name: Not on file   • Number of children: Not on file   • Years of  education: Not on file   • Highest education level: Not on file   Tobacco Use   • Smoking status: Never Smoker   • Smokeless tobacco: Never Used   Vaping Use   • Vaping Use: Never used   Substance and Sexual Activity   • Alcohol use: No   • Drug use: No   • Sexual activity: Defer         Review of Systems   Constitutional: Negative for activity change, appetite change, chills, diaphoresis, fatigue, fever and unexpected weight change.   HENT: Negative for congestion, dental problem, drooling, ear discharge, ear pain, facial swelling, hearing loss, mouth sores, nosebleeds, postnasal drip, rhinorrhea, sinus pressure, sinus pain, sneezing, sore throat, tinnitus, trouble swallowing and voice change.    Eyes: Negative for photophobia, pain, discharge, redness, itching and visual disturbance.   Respiratory: Negative for apnea, cough, choking, chest tightness, shortness of breath, wheezing and stridor.    Cardiovascular: Negative for chest pain, palpitations and leg swelling.   Gastrointestinal: Negative for abdominal distention, abdominal pain, anal bleeding, blood in stool, constipation, diarrhea, nausea, rectal pain and vomiting.   Endocrine: Negative for cold intolerance, heat intolerance, polydipsia, polyphagia and polyuria.   Genitourinary: Negative for decreased urine volume, difficulty urinating, dyspareunia, dysuria, enuresis, flank pain, frequency, genital sores, hematuria, menstrual problem, pelvic pain, urgency, vaginal bleeding, vaginal discharge and vaginal pain.   Musculoskeletal: Negative for arthralgias, back pain, gait problem, joint swelling, myalgias, neck pain and neck stiffness.   Skin: Negative for color change, pallor, rash and wound.   Allergic/Immunologic: Negative for environmental allergies, food allergies and immunocompromised state.   Neurological: Negative for dizziness, tremors, seizures, syncope, facial asymmetry, speech difficulty, weakness, light-headedness, numbness and headaches.  "  Hematological: Negative for adenopathy. Does not bruise/bleed easily.   Psychiatric/Behavioral: Negative for agitation, behavioral problems, confusion, decreased concentration, dysphoric mood, hallucinations, self-injury, sleep disturbance and suicidal ideas. The patient is not nervous/anxious and is not hyperactive.        Objective   Vital Signs: Blood pressure 110/80, temperature 97.1 °F (36.2 °C), height 170.2 cm (67\"), weight 97.7 kg (215 lb 6.4 oz).  Physical Exam  Musculoskeletal:     Strength is intact in upper and lower extremities to direct testing.     Station and gait are normal.     Straight leg raising is negative.   Neurologic:     Muscle tone is normal throughout.     Coordination is intact.     Deep tendon reflexes: 2+ and symmetrical.     Sensation is intact to light touch throughout.     Patient is oriented to person, place, and time.         Independent review of radiographic imaging: []    Assessment/Plan   Diagnosis:    Medical Decision Making: []    Diagnoses and all orders for this visit:    1. Lumbar disc herniation with radiculopathy (Primary)    2. DDD (degenerative disc disease), lumbar                        Patient's Body mass index is 33.74 kg/m². BMI is {BMI range:68708}.         Betzy Westbrook, [facility]  Patient Care Team:  Karl Velazquez MD as PCP - General (Internal Medicine)  Karl Velazquez MD as Referring Physician (Internal Medicine)              "

## 2021-04-21 NOTE — PROGRESS NOTES
"Patient: Nain Hutchins  : 1960  Chart #: [id number]    Date of Service: 2021    CHIEF COMPLAINT: Right L4-5 disc herniation      History of Present Illness Ms Hutchins is a 60-year-old woman who presented with severe back and right leg pain that had become unremitting.  Preoperative studies demonstrated disc protrusion rightward at L4-5 that provided clinical correlation.  As such on 2021 she underwent an uncomplicated right L4-5 decompression and discectomy.    Today patient is 3 weeks postop.  She is doing much better. She has a \"tight\" feeling in the right ankle intermittently. She also has some soreness in the right buttock.  No real bothersome complaints. She is thrilled with her progress. She is not taking any pain medications. No incisional issues.     As a side note, she mentions she is concerned about some memory issues she has noticed over the past several months to a year. She has a family history of dementia.  We talked about seeing neurology       Past Medical History:   Diagnosis Date   • Allergic    • Cancer (CMS/HCC)     Skin   • Diabetes mellitus (CMS/HCC)    • Disease of thyroid gland    • GERD (gastroesophageal reflux disease)    • Hypertension    • Low back pain    • PONV (postoperative nausea and vomiting)    • Sleep apnea     CPAP         Current Outpatient Medications:   •  atorvastatin (LIPITOR) 20 MG tablet, Take 20 mg by mouth Every Night., Disp: , Rfl:   •  Cholecalciferol (Vitamin D3) 25 MCG (1000 UT) capsule, Take 1,000 Units by mouth Daily., Disp: , Rfl:   •  fluconazole (DIFLUCAN) 150 MG tablet, Take 1 tablet by mouth Every 72 (Seventy-Two) Hours., Disp: 2 tablet, Rfl: 0  •  levothyroxine (SYNTHROID, LEVOTHROID) 50 MCG tablet, Take 50 mcg by mouth Daily., Disp: , Rfl:   •  lisinopril (PRINIVIL,ZESTRIL) 40 MG tablet, Take 40 mg by mouth Daily., Disp: , Rfl:   •  metFORMIN ER (GLUCOPHAGE-XR) 500 MG 24 hr tablet, Take 1 tablet by mouth Daily., Disp: 3 tablet, Rfl: 0  •  " metoprolol succinate XL (TOPROL-XL) 100 MG 24 hr tablet, Take 100 mg by mouth Daily., Disp: , Rfl:   •  omeprazole (priLOSEC) 40 MG capsule, Take 1 capsule by mouth Daily., Disp: , Rfl:   •  orphenadrine (NORFLEX) 100 MG 12 hr tablet, Take 100 mg by mouth 2 (Two) Times a Day., Disp: , Rfl:   •  vitamin E 1000 UNIT capsule, Take 1,000 Units by mouth Daily., Disp: , Rfl:     Past Surgical History:   Procedure Laterality Date   • BACK SURGERY Right 01/29/2009    L5-S1 Laminectomy - Dr. Gamez   • COLONOSCOPY  2018   • KIDNEY STONE SURGERY  09/2010   • LUMBAR DISCECTOMY Right 4/1/2021    Procedure: LUMBAR DISCECTOMY L4-5;  Surgeon: Kit Castelan MD;  Location: [facility];  Service: Neurosurgery;  Laterality: Right;   • ROTATOR CUFF REPAIR Right 09/2007   • TUBAL ABDOMINAL LIGATION  02/29/2000       Social History     Socioeconomic History   • Marital status:      Spouse name: Not on file   • Number of children: Not on file   • Years of education: Not on file   • Highest education level: Not on file   Tobacco Use   • Smoking status: Never Smoker   • Smokeless tobacco: Never Used   Vaping Use   • Vaping Use: Never used   Substance and Sexual Activity   • Alcohol use: No   • Drug use: No   • Sexual activity: Defer         Review of Systems   Constitutional: Negative for activity change, appetite change, chills and fever.   HENT: Negative for facial swelling, hearing loss, tinnitus, trouble swallowing and voice change.    Eyes: Negative for pain and visual disturbance.   Respiratory: Negative for apnea and shortness of breath.    Cardiovascular: Negative for leg swelling.   Gastrointestinal: Negative for constipation, nausea and vomiting.   Genitourinary: Negative for difficulty urinating and dysuria.   Musculoskeletal: Negative for back pain, gait problem, joint swelling, neck pain and neck stiffness.   Skin: Negative for color change.   Neurological: Negative for dizziness, facial asymmetry, speech  "difficulty, weakness and numbness.   Psychiatric/Behavioral: Negative for behavioral problems, confusion, dysphoric mood and sleep disturbance. The patient is not nervous/anxious.        Objective   Vital Signs: Blood pressure 110/80, temperature 97.1 °F (36.2 °C), height 170.2 cm (67\"), weight 97.7 kg (215 lb 6.4 oz).  Physical Exam  Vitals and nursing note reviewed.   Constitutional:       General: She is not in acute distress.     Appearance: She is well-developed.   Skin:     Comments: Well healed lumbar incision    Psychiatric:         Behavior: Behavior normal.         Thought Content: Thought content normal.       Assessment/Plan   Diagnosis: Right L4-5 disc herniation s/p foraminotomy and discectomy.     Medical Decision Making: Patient is 3 weeks post-op and doing very well. No real complaints today. I reviewed limitations and restrictions as she slowly works up on her activities.  She will plan to return to work in two weeks- I encouraged her to avoid sitting all day.  Follow up with Dr Castelan in 6-8 weeks. Call the office in the interim with new or worsening symptoms.       Diagnoses and all orders for this visit:    1. Lumbar disc herniation with radiculopathy (Primary)    2. DDD (degenerative disc disease), lumbar                          Patient's Body mass index is 33.74 kg/m². BMI is above normal parameters. Recommendations include: exercise counseling and nutrition counseling.         Mary Kenney PA-C  Patient Care Team:  Karl Velazquez MD as PCP - General (Internal Medicine)  Karl Velazquez MD as Referring Physician (Internal Medicine)                "

## 2021-05-07 ENCOUNTER — TELEPHONE (OUTPATIENT)
Dept: NEUROSURGERY | Facility: CLINIC | Age: 61
End: 2021-05-07

## 2021-05-07 RX ORDER — MELOXICAM 15 MG/1
15 TABLET ORAL DAILY
Qty: 30 TABLET | Refills: 0 | Status: SHIPPED | OUTPATIENT
Start: 2021-05-07 | End: 2021-06-10

## 2021-05-07 NOTE — TELEPHONE ENCOUNTER
Patient returned to work yesterday. She is sitting all day. It is hard to take breaks to walk around.  I recommended she try half days next week and then return to full days the following week. I also prescribed some mobic to help with the low back pain. Hopefully she will only need this for a month until she is back on her normal routine.  I encouraged to change positions every hour while at work.         Please email a note to patient requesting half days at work 5/10-5/14. She can return with regular hours 5/17  [email or web address]

## 2021-05-07 NOTE — TELEPHONE ENCOUNTER
Provider:  Flip  Caller: pt  Time of call:   9:54  Phone #:  [phone]  Surgery:  Lumbar discectomy  Surgery Date:  4-1-21  Last visit:   4-21-21  Next visit: 6-2-21    MARIE:         Reason for call:       Pt is having more back pain she did try to go back to work early because they were short handed.    When did it start: around the 21st    Where is it located:lower in her back     How long has this been going on/is this new or the same as before surgery:  Since her last visit/ new    Characteristics of symptom/severity: new pain feels like something is pushing up against her spine when she sits.    Timing- Is it constant or intermittent:  Intermittent when sitting    What makes it worse: sitting but when she stands it hurts also    What makes it better: laying down    What therapies/medications have you tried:  Tylenol 500 mg did not help

## 2021-06-02 ENCOUNTER — OFFICE VISIT (OUTPATIENT)
Dept: NEUROSURGERY | Facility: CLINIC | Age: 61
End: 2021-06-02

## 2021-06-02 VITALS — WEIGHT: 219 LBS | BODY MASS INDEX: 34.37 KG/M2 | HEIGHT: 67 IN | TEMPERATURE: 97.3 F

## 2021-06-02 DIAGNOSIS — M51.16 LUMBAR DISC HERNIATION WITH RADICULOPATHY: ICD-10-CM

## 2021-06-02 DIAGNOSIS — Z98.890 S/P LUMBAR DISCECTOMY: Primary | ICD-10-CM

## 2021-06-02 DIAGNOSIS — Z81.8 FAMILY HISTORY OF DEMENTIA: ICD-10-CM

## 2021-06-02 PROCEDURE — 99024 POSTOP FOLLOW-UP VISIT: CPT | Performed by: NEUROLOGICAL SURGERY

## 2021-06-02 NOTE — PROGRESS NOTES
Patient: Nain Hutchins  : 1960    Primary Care Provider: Karl Velazquez MD    Requesting Provider: As above        History    Chief Complaint: Low back and right leg pain.    History of Present Illness: Ms Hutchins is a 60-year-old woman who presented with severe back and right leg pain that had become unremitting.  Preoperative studies demonstrated disc protrusion rightward at L4-5 that provided clinical correlation.  As such on 2021 she underwent an uncomplicated right L4-5 decompression and discectomy.  In terms of the leg pain she is doing great she has no leg pain.  She has pronounced low back pain that precludes sitting or standing.  She does better if she is moving.  She went back to work for couple of days and then had to stop.  She complains of memory problems.  Her mother had profound dementia and she is very fearful about that.  She has severe anxiety.  She has taken a leave from work.    Review of Systems   Constitutional: Negative for activity change, appetite change, chills, diaphoresis, fatigue, fever and unexpected weight change.   HENT: Negative for congestion, dental problem, drooling, ear discharge, ear pain, facial swelling, hearing loss, mouth sores, nosebleeds, postnasal drip, rhinorrhea, sinus pressure, sinus pain, sneezing, sore throat, tinnitus, trouble swallowing and voice change.    Eyes: Negative for photophobia, pain, discharge, redness, itching and visual disturbance.   Respiratory: Negative for apnea, cough, choking, chest tightness, shortness of breath, wheezing and stridor.    Cardiovascular: Negative for chest pain, palpitations and leg swelling.   Gastrointestinal: Negative for abdominal distention, abdominal pain, anal bleeding, blood in stool, constipation, diarrhea, nausea, rectal pain and vomiting.   Endocrine: Negative for cold intolerance, heat intolerance, polydipsia, polyphagia and polyuria.   Genitourinary: Negative for decreased urine volume,  "difficulty urinating, dysuria, enuresis, flank pain, frequency, genital sores, hematuria and urgency.   Musculoskeletal: Positive for back pain. Negative for arthralgias, gait problem, joint swelling, myalgias, neck pain and neck stiffness.   Skin: Negative for color change, pallor, rash and wound.   Allergic/Immunologic: Negative for environmental allergies, food allergies and immunocompromised state.   Neurological: Negative for dizziness, tremors, seizures, syncope, facial asymmetry, speech difficulty, weakness, light-headedness, numbness and headaches.   Hematological: Negative for adenopathy. Does not bruise/bleed easily.   Psychiatric/Behavioral: Positive for decreased concentration. Negative for agitation, behavioral problems, confusion, dysphoric mood, hallucinations, self-injury, sleep disturbance and suicidal ideas. The patient is not nervous/anxious and is not hyperactive.    All other systems reviewed and are negative.      The patient's past medical history, past surgical history, family history, and social history have been reviewed at length in the electronic medical record.    Physical Exam:   Temp 97.3 °F (36.3 °C)   Ht 170.2 cm (67.01\")   Wt 99.3 kg (219 lb)   BMI 34.29 kg/m²   The patient is clearly anxious.  Her lumbar incision looks great.  She ambulates without difficulty.    Medical Decision Making      Diagnosis:   Right L4-5 disc herniation status post discectomy.    Treatment Options:   I am pleased that her radicular symptoms are absent.  It is not unusual to have back pain postoperatively.  This usually dissipates over time.  I have asked her to take ibuprofen 400-600 mg 3 times a day regularly.  She will follow-up in our clinic in about 3 weeks.  I believe that her anxiety is magnifying issues.  At her request I have referred her to neurology to assess her memory complaints.       Diagnosis Plan   1. S/P lumbar discectomy     2. Lumbar disc herniation with radiculopathy     3. Family " history of dementia  Ambulatory Referral to Neurology       Scribed for Kit Castelan MD by Jane Coy, [facility] 6/2/2021 13:13 EDT      I, Dr. Castelan, personally performed the services described in the documentation, as scribed in my presence, and it is both accurate and complete.

## 2021-06-04 ENCOUNTER — TELEPHONE (OUTPATIENT)
Dept: NEUROSURGERY | Facility: CLINIC | Age: 61
End: 2021-06-04

## 2021-06-04 NOTE — TELEPHONE ENCOUNTER
Provider:  Flip  Caller: pt  Time of call:   3:05  Phone #:  [phone]  Surgery:  Lumbar discectomy  Surgery Date:  4-1-21  Last visit:  6-2-21   Next visit: 6-23-21    MARIE:         Reason for call:   Patient called and stated that Dr. Castelan said he was going to call her in 600mg Tylenol but the pharmacy has not received it.  Please Advise. Thank .you

## 2021-06-07 NOTE — TELEPHONE ENCOUNTER
Spoke to patient and notified her of the Tylenol arthritis 650 mg OTC. Patient voiced understanding and was thankful for the call back.

## 2021-06-10 DIAGNOSIS — Z98.890 S/P LUMBAR DISCECTOMY: Primary | ICD-10-CM

## 2021-06-10 DIAGNOSIS — M51.36 DDD (DEGENERATIVE DISC DISEASE), LUMBAR: ICD-10-CM

## 2021-06-10 DIAGNOSIS — M51.16 LUMBAR DISC HERNIATION WITH RADICULOPATHY: ICD-10-CM

## 2021-06-10 RX ORDER — MELOXICAM 15 MG/1
TABLET ORAL
Qty: 30 TABLET | Refills: 2 | Status: SHIPPED | OUTPATIENT
Start: 2021-06-10 | End: 2021-06-25 | Stop reason: SDUPTHER

## 2021-06-10 NOTE — TELEPHONE ENCOUNTER
Provider:  Mary MACHADO  Caller: CARLIN  Time of call:     Phone #:  [phone]  Surgery:  Lumbar discectomy  Surgery Date:  04-01-21  Last visit:   06-02-21  Next visit: 06-25-21    MARIE:         Reason for call:     Patient requests refill on Mobic.

## 2021-06-25 ENCOUNTER — OFFICE VISIT (OUTPATIENT)
Dept: NEUROSURGERY | Facility: CLINIC | Age: 61
End: 2021-06-25

## 2021-06-25 VITALS
HEART RATE: 71 BPM | BODY MASS INDEX: 34.65 KG/M2 | WEIGHT: 220.8 LBS | HEIGHT: 67 IN | OXYGEN SATURATION: 99 % | DIASTOLIC BLOOD PRESSURE: 90 MMHG | SYSTOLIC BLOOD PRESSURE: 130 MMHG | RESPIRATION RATE: 18 BRPM

## 2021-06-25 DIAGNOSIS — M51.36 DDD (DEGENERATIVE DISC DISEASE), LUMBAR: ICD-10-CM

## 2021-06-25 DIAGNOSIS — M51.16 LUMBAR DISC HERNIATION WITH RADICULOPATHY: ICD-10-CM

## 2021-06-25 DIAGNOSIS — Z81.8 FAMILY HISTORY OF DEMENTIA: ICD-10-CM

## 2021-06-25 DIAGNOSIS — Z98.890 S/P LUMBAR DISCECTOMY: Primary | ICD-10-CM

## 2021-06-25 PROCEDURE — 99024 POSTOP FOLLOW-UP VISIT: CPT | Performed by: PHYSICIAN ASSISTANT

## 2021-06-25 RX ORDER — MELOXICAM 15 MG/1
15 TABLET ORAL DAILY
Qty: 30 TABLET | Refills: 2 | Status: SHIPPED | OUTPATIENT
Start: 2021-06-25 | End: 2022-08-12

## 2021-06-25 NOTE — PROGRESS NOTES
Patient: Nain Hutchins  : 1960  Chart #: [id number]    Date of Service: 2021    CHIEF COMPLAINT: Low back and right leg pain    History of Present Illness Ms. Hutchins is seen in follow-up.  She is a 60-year-old woman who presented with severe back and right leg pain that had become unremitting.  Ultimately on 2021 she underwent an uncomplicated right L4-5 decompression and discectomy.  In terms of the leg pain she has been doing very well.  However she has pronounced low back pain that precludes sitting or standing.  She does better when moving.  She went back to work for couple weeks and then had to stop.  When last seen, Dr. Castelan prescribed ibuprofen 400-600 mg 3 times a day regularly. She admits she does not take it regularly. She takes meloxicam on bad days and then takes tylenol every night. Symptoms have not progressed but they have not improved either. She has noticed she cannot go up on her toes on the right. She is not sure if this is new or not.        Past Medical History:   Diagnosis Date   • Allergic    • Cancer (CMS/HCC)     Skin   • Diabetes mellitus (CMS/HCC)    • Disease of thyroid gland    • GERD (gastroesophageal reflux disease)    • Hypertension    • Low back pain    • PONV (postoperative nausea and vomiting)    • Sleep apnea     CPAP         Current Outpatient Medications:   •  atorvastatin (LIPITOR) 20 MG tablet, Take 20 mg by mouth Every Night., Disp: , Rfl:   •  Cholecalciferol (Vitamin D3) 25 MCG (1000 UT) capsule, Take 1,000 Units by mouth Daily., Disp: , Rfl:   •  levothyroxine (SYNTHROID, LEVOTHROID) 50 MCG tablet, Take 50 mcg by mouth Daily., Disp: , Rfl:   •  lisinopril (PRINIVIL,ZESTRIL) 40 MG tablet, Take 40 mg by mouth Daily., Disp: , Rfl:   •  meloxicam (MOBIC) 15 MG tablet, Take 1 tablet by mouth Daily., Disp: 30 tablet, Rfl: 2  •  metFORMIN ER (GLUCOPHAGE-XR) 500 MG 24 hr tablet, Take 1 tablet by mouth Daily., Disp: 3 tablet, Rfl: 0  •  metoprolol succinate  XL (TOPROL-XL) 100 MG 24 hr tablet, Take 100 mg by mouth Daily., Disp: , Rfl:   •  omeprazole (priLOSEC) 40 MG capsule, Take 1 capsule by mouth Daily., Disp: , Rfl:   •  orphenadrine (NORFLEX) 100 MG 12 hr tablet, Take 100 mg by mouth 2 (Two) Times a Day., Disp: , Rfl:   •  vitamin E 1000 UNIT capsule, Take 1,000 Units by mouth Daily., Disp: , Rfl:     Past Surgical History:   Procedure Laterality Date   • BACK SURGERY Right 01/29/2009    L5-S1 Laminectomy - Dr. Gamez   • COLONOSCOPY  2018   • KIDNEY STONE SURGERY  09/2010   • LUMBAR DISCECTOMY Right 4/1/2021    Procedure: LUMBAR DISCECTOMY L4-5;  Surgeon: Kit Castelan MD;  Location: [facility];  Service: Neurosurgery;  Laterality: Right;   • ROTATOR CUFF REPAIR Right 09/2007   • TUBAL ABDOMINAL LIGATION  02/29/2000       Social History     Socioeconomic History   • Marital status:      Spouse name: Not on file   • Number of children: Not on file   • Years of education: Not on file   • Highest education level: Not on file   Tobacco Use   • Smoking status: Never Smoker   • Smokeless tobacco: Never Used   Vaping Use   • Vaping Use: Never used   Substance and Sexual Activity   • Alcohol use: No   • Drug use: No   • Sexual activity: Defer         Review of Systems   Constitutional: Negative for activity change, appetite change, chills and fever.   HENT: Negative for facial swelling, hearing loss, tinnitus, trouble swallowing and voice change.    Eyes: Negative for pain and visual disturbance.   Respiratory: Negative for apnea and shortness of breath.    Cardiovascular: Negative for leg swelling.   Gastrointestinal: Negative for constipation, nausea and vomiting.   Genitourinary: Negative for difficulty urinating and dysuria.   Musculoskeletal: Positive for back pain. Negative for gait problem, joint swelling, neck pain and neck stiffness.   Skin: Negative for color change.   Neurological: Negative for dizziness, facial asymmetry, speech difficulty,  "weakness and numbness.   Psychiatric/Behavioral: Negative for behavioral problems, confusion, dysphoric mood and sleep disturbance. The patient is not nervous/anxious.        Objective   Vital Signs: Blood pressure 130/90, pulse 71, resp. rate 18, height 170.2 cm (67.01\"), weight 100 kg (220 lb 12.8 oz), SpO2 99 %.  Physical Exam  Vitals and nursing note reviewed.   Constitutional:       General: She is not in acute distress.     Appearance: She is well-developed.   HENT:      Head: Normocephalic and atraumatic.   Eyes:      Pupils: Pupils are equal, round, and reactive to light.   Psychiatric:         Behavior: Behavior normal.         Thought Content: Thought content normal.     Musculoskeletal:     Patient having trouble going up on her toes on the right. She is not sure if this is new or not.      Station and gait are normal.  Neurologic:     Muscle tone is normal throughout.     Coordination is intact.     Sensation is intact to light touch throughout.     Patient is oriented to person, place, and time.      Assessment/Plan   Diagnosis: Right L4-5 disc herniation status post discectomy, 4/1/2021    Medical Decision Making: Ms. Hutchins continues with right sided low back pain.  She has some discomfort in the calf at times and is having trouble going up on her toes.  No radicular pain. I would like her to try Mobic consistently for one month. If symptoms do not improve, we may get an MRI of the lumbar spine to make sure we are not missing something.  She may call anytime if symptoms progress and I will move forward with MRI.     Diagnoses and all orders for this visit:    1. S/P lumbar discectomy (Primary)  -     meloxicam (MOBIC) 15 MG tablet; Take 1 tablet by mouth Daily.  Dispense: 30 tablet; Refill: 2    2. Lumbar disc herniation with radiculopathy  -     meloxicam (MOBIC) 15 MG tablet; Take 1 tablet by mouth Daily.  Dispense: 30 tablet; Refill: 2    3. DDD (degenerative disc disease), lumbar  -     meloxicam " (MOBIC) 15 MG tablet; Take 1 tablet by mouth Daily.  Dispense: 30 tablet; Refill: 2    4. Family history of dementia                      Mary Kenney PA-C  Patient Care Team:  Karl Velazquez MD as PCP - General (Internal Medicine)  Karl eVlazquez MD as Referring Physician (Internal Medicine)

## 2021-07-29 ENCOUNTER — OFFICE VISIT (OUTPATIENT)
Dept: NEUROLOGY | Facility: CLINIC | Age: 61
End: 2021-07-29

## 2021-07-29 VITALS
OXYGEN SATURATION: 97 % | DIASTOLIC BLOOD PRESSURE: 84 MMHG | BODY MASS INDEX: 34.53 KG/M2 | HEIGHT: 67 IN | TEMPERATURE: 97.3 F | WEIGHT: 220 LBS | HEART RATE: 77 BPM | SYSTOLIC BLOOD PRESSURE: 122 MMHG

## 2021-07-29 DIAGNOSIS — R41.3 MEMORY LOSS: Primary | ICD-10-CM

## 2021-07-29 PROCEDURE — 99215 OFFICE O/P EST HI 40 MIN: CPT | Performed by: PHYSICIAN ASSISTANT

## 2021-07-29 RX ORDER — CLOBETASOL PROPIONATE 0.5 MG/G
CREAM TOPICAL
COMMUNITY
Start: 2021-07-14

## 2021-07-29 RX ORDER — FLUTICASONE PROPIONATE 50 MCG
SPRAY, SUSPENSION (ML) NASAL
COMMUNITY
Start: 2021-06-30

## 2021-07-29 RX ORDER — DULAGLUTIDE 0.75 MG/.5ML
INJECTION, SOLUTION SUBCUTANEOUS
COMMUNITY
Start: 2021-07-20 | End: 2022-02-09 | Stop reason: DRUGHIGH

## 2021-07-29 NOTE — PROGRESS NOTES
"Subjective       Chief Complaint: memory loss      History of Present Illness   Nain Hutchins is a 60 y.o. female who comes to clinic today for evaluation of memory loss . She has noted symptoms for at least several years marked initially by forgetfulness and word-finding difficulties. This has worsened  over time, particularly after she contracted COVID-19 in 11/20. Additional symptoms have included impairments in both short and long term memory as well as executive function. There have been associated symptoms of anxiety and depression. She denies impairments in ADL's. She is out on leave at her current job as an , primarily due to ongoing back pain and her recent surgery though she was having difficulty performing her job duties due to her cognitive issues. She manages her medications  and finances. She continues to drive.  She is currently residing with family.       Family history: her mother had a history of dementia, initially developing symptoms in her late 60's.      I have reviewed and confirmed the past family, social and medical history as accurate on 7/29/21.     Review of Systems   Constitutional: Negative.    HENT: Negative.    Eyes: Negative.    Respiratory: Negative.    Cardiovascular: Negative.    Gastrointestinal: Negative.    Endocrine: Negative.    Genitourinary: Negative.    Musculoskeletal: Negative.    Skin: Negative.    Allergic/Immunologic: Negative.    Neurological:        Memory loss    Hematological: Negative.        Objective     /84   Pulse 77   Temp 97.3 °F (36.3 °C)   Ht 170.2 cm (67.01\")   Wt 99.8 kg (220 lb)   SpO2 97%   BMI 34.45 kg/m²     General appearance today is normal.       Physical Exam  Neurological:      Mental Status: She is oriented to person, place, and time.      Coordination: Finger-Nose-Finger Test and Heel to Shin Test normal.      Gait: Gait is intact.      Deep Tendon Reflexes: Strength normal.   Psychiatric:         Speech: Speech " normal.          Neurologic Exam     Mental Status   Oriented to person, place, and time.   Registration: recalls 3 of 3 objects. Recall at 5 minutes: recalls 2 of 3 objects. Follows 3 step commands.   Attention: normal.   Speech: speech is normal   Level of consciousness: alert  Able to name object. Able to read. Able to repeat. Able to write. Normal comprehension.     Cranial Nerves   Cranial nerves II through XII intact.     Motor Exam   Muscle bulk: normal  Overall muscle tone: normal    Strength   Strength 5/5 throughout.     Sensory Exam   Light touch normal.     Gait, Coordination, and Reflexes     Gait  Gait: normal    Coordination   Finger to nose coordination: normal  Heel to shin coordination: normal    Tremor   Resting tremor: absent        Results  MMSE= 29  MoCA= 26 (4/5 executive functioning)(3/5 recall)       Assessment/Plan   Diagnoses and all orders for this visit:    1. Memory loss (Primary)  -     CBC & Differential  -     Comprehensive Metabolic Panel  -     Folate  -     MRI Brain Without Contrast  -     TSH  -     Ammonia  -     RPR  -     HIV-1 / O / 2 Ag / Antibody 4th Generation          Discussion/Summary   Nain Hutchins comes to clinic today for evaluation of memory loss . Her history and examination today, including cognitive bedside testing is somewhat concerning for [facility]. However, I am concerned that her history of anxiety and chronic pain may be negatively affecting her cognition. This was discussed in detail.  It was elected to obtain screening blood work  and an MRI of the brain . Next, we could consider obtaining neuropsychological testing. It was not elected to add a cognitive enhancer. I also discussed  a referral to SLP for cognitive rehabilitation, which she will consider in the future. She will then follow up in 6 months , or sooner if needed.   Total time of visit today: 45 minutes. As part of this visit I obtained additional history from the family which is incorporated in  the HPI. I also discussed diagnosis, prognosis, diagnostic testing, evaluation, current status, treatment options and management as discussed above.         Jessica Gonzalez PA-C

## 2021-07-30 LAB
ALBUMIN SERPL-MCNC: 4.8 G/DL (ref 3.5–5.2)
ALBUMIN/GLOB SERPL: 1.6 G/DL
ALP SERPL-CCNC: 70 U/L (ref 39–117)
ALT SERPL-CCNC: 56 U/L (ref 1–33)
AMMONIA PLAS-MCNC: 31 UMOL/L (ref 11–51)
AST SERPL-CCNC: 47 U/L (ref 1–32)
BASOPHILS # BLD AUTO: 0.03 10*3/MM3 (ref 0–0.2)
BASOPHILS NFR BLD AUTO: 0.5 % (ref 0–1.5)
BILIRUB SERPL-MCNC: 0.3 MG/DL (ref 0–1.2)
BUN SERPL-MCNC: 10 MG/DL (ref 6–20)
BUN/CREAT SERPL: 13.9 (ref 7–25)
CALCIUM SERPL-MCNC: 10.5 MG/DL (ref 8.6–10.5)
CHLORIDE SERPL-SCNC: 104 MMOL/L (ref 98–107)
CO2 SERPL-SCNC: 25.5 MMOL/L (ref 22–29)
CREAT SERPL-MCNC: 0.72 MG/DL (ref 0.57–1)
EOSINOPHIL # BLD AUTO: 0.1 10*3/MM3 (ref 0–0.4)
EOSINOPHIL NFR BLD AUTO: 1.7 % (ref 0.3–6.2)
ERYTHROCYTE [DISTWIDTH] IN BLOOD BY AUTOMATED COUNT: 14.9 % (ref 12.3–15.4)
FOLATE SERPL-MCNC: 13.5 NG/ML (ref 4.78–24.2)
GLOBULIN SER CALC-MCNC: 3 GM/DL
GLUCOSE SERPL-MCNC: 102 MG/DL (ref 65–99)
HCT VFR BLD AUTO: 42.3 % (ref 34–46.6)
HGB BLD-MCNC: 12.8 G/DL (ref 12–15.9)
HIV 1+2 AB+HIV1 P24 AG SERPL QL IA: NON REACTIVE
IMM GRANULOCYTES # BLD AUTO: 0 10*3/MM3 (ref 0–0.05)
IMM GRANULOCYTES NFR BLD AUTO: 0 % (ref 0–0.5)
LYMPHOCYTES # BLD AUTO: 2.94 10*3/MM3 (ref 0.7–3.1)
LYMPHOCYTES NFR BLD AUTO: 50.7 % (ref 19.6–45.3)
MCH RBC QN AUTO: 24.1 PG (ref 26.6–33)
MCHC RBC AUTO-ENTMCNC: 30.3 G/DL (ref 31.5–35.7)
MCV RBC AUTO: 79.5 FL (ref 79–97)
MONOCYTES # BLD AUTO: 0.57 10*3/MM3 (ref 0.1–0.9)
MONOCYTES NFR BLD AUTO: 9.8 % (ref 5–12)
NEUTROPHILS # BLD AUTO: 2.16 10*3/MM3 (ref 1.7–7)
NEUTROPHILS NFR BLD AUTO: 37.3 % (ref 42.7–76)
NRBC BLD AUTO-RTO: 0 /100 WBC (ref 0–0.2)
PLATELET # BLD AUTO: 293 10*3/MM3 (ref 140–450)
POTASSIUM SERPL-SCNC: 5.2 MMOL/L (ref 3.5–5.2)
PROT SERPL-MCNC: 7.8 G/DL (ref 6–8.5)
RBC # BLD AUTO: 5.32 10*6/MM3 (ref 3.77–5.28)
RPR SER QL: NON REACTIVE
SODIUM SERPL-SCNC: 142 MMOL/L (ref 136–145)
TSH SERPL DL<=0.005 MIU/L-ACNC: 2.15 UIU/ML (ref 0.27–4.2)
WBC # BLD AUTO: 5.8 10*3/MM3 (ref 3.4–10.8)

## 2021-08-02 ENCOUNTER — TELEPHONE (OUTPATIENT)
Dept: NEUROLOGY | Facility: CLINIC | Age: 61
End: 2021-08-02

## 2021-08-02 NOTE — TELEPHONE ENCOUNTER
----- Message from Jessica Gonzalez PA-C sent at 7/30/2021  3:23 PM EDT -----  Would you care to let Ms. Hutchins know that I reviewed her labs? A couple of her liver enzymes were a bit elevated. Therefore, we will forward the results to her PCP for further review. Thanks

## 2021-08-04 ENCOUNTER — OFFICE VISIT (OUTPATIENT)
Dept: NEUROSURGERY | Facility: CLINIC | Age: 61
End: 2021-08-04

## 2021-08-04 VITALS
WEIGHT: 218 LBS | OXYGEN SATURATION: 99 % | BODY MASS INDEX: 34.21 KG/M2 | HEIGHT: 67 IN | HEART RATE: 102 BPM | DIASTOLIC BLOOD PRESSURE: 80 MMHG | TEMPERATURE: 97.6 F | SYSTOLIC BLOOD PRESSURE: 126 MMHG | RESPIRATION RATE: 20 BRPM

## 2021-08-04 DIAGNOSIS — Z98.890 S/P LUMBAR DISCECTOMY: Primary | ICD-10-CM

## 2021-08-04 DIAGNOSIS — M51.36 DDD (DEGENERATIVE DISC DISEASE), LUMBAR: ICD-10-CM

## 2021-08-04 DIAGNOSIS — M51.16 LUMBAR DISC HERNIATION WITH RADICULOPATHY: ICD-10-CM

## 2021-08-04 DIAGNOSIS — Z81.8 FAMILY HISTORY OF DEMENTIA: ICD-10-CM

## 2021-08-04 PROCEDURE — 99213 OFFICE O/P EST LOW 20 MIN: CPT | Performed by: PHYSICIAN ASSISTANT

## 2021-08-04 NOTE — PROGRESS NOTES
Patient: Nain Hutchins  : 1960  Chart #: [id number]    Date of Service: 2021    CHIEF COMPLAINT: Low back and right leg pain    History of Present Illness Ms. Hutchins is seen in follow-up.  She is a 60-year-old woman who presented with severe back and right leg pain that had become unremitting.  Ultimately on 2021 she underwent an uncomplicated right L4-5 decompression and discectomy.  In terms of the leg pain she has been doing very well.  However she has pronounced low back pain that precludes sitting or standing.  She does better when moving.  She went back to work for couple weeks and then had to stop.  When last seen, Dr. Castelan prescribed ibuprofen 400-600 mg 3 times a day regularly. She admits she does not take it regularly. She takes meloxicam on bad days and then takes tylenol every night. Symptoms have not progressed but they have not improved either. She has noticed she cannot go up on her toes on the right. She is not sure if this is new or not.  Today she admits she has been taking Mobic regularly for the past month without improvement in symptoms.  She continues complain of pain localized to the low back.  No pain in her legs whatsoever.  Symptoms are worse when she is sitting.      Past Medical History:   Diagnosis Date   • Allergic    • Cancer (CMS/HCC)     Skin   • Diabetes mellitus (CMS/HCC)    • Disease of thyroid gland    • GERD (gastroesophageal reflux disease)    • Hypertension    • Low back pain    • PONV (postoperative nausea and vomiting)    • Sleep apnea     CPAP         Current Outpatient Medications:   •  atorvastatin (LIPITOR) 20 MG tablet, Take 20 mg by mouth Every Night., Disp: , Rfl:   •  Cholecalciferol (Vitamin D3) 25 MCG (1000 UT) capsule, Take 1,000 Units by mouth Daily., Disp: , Rfl:   •  clobetasol (TEMOVATE) 0.05 % cream, APPLY PEA SIZED AMOUNT TO AFFECTED AREA TWICE WEEKLY, Disp: , Rfl:   •  fluticasone (FLONASE) 50 MCG/ACT nasal spray, SPRAY 1 SPRAY INTO  EACH NOSTRIL TWICE A DAY, Disp: , Rfl:   •  levothyroxine (SYNTHROID, LEVOTHROID) 50 MCG tablet, Take 50 mcg by mouth Daily., Disp: , Rfl:   •  lisinopril (PRINIVIL,ZESTRIL) 40 MG tablet, Take 40 mg by mouth Daily., Disp: , Rfl:   •  meloxicam (MOBIC) 15 MG tablet, Take 1 tablet by mouth Daily., Disp: 30 tablet, Rfl: 2  •  metFORMIN ER (GLUCOPHAGE-XR) 500 MG 24 hr tablet, Take 1 tablet by mouth Daily., Disp: 3 tablet, Rfl: 0  •  metoprolol succinate XL (TOPROL-XL) 100 MG 24 hr tablet, Take 100 mg by mouth Daily., Disp: , Rfl:   •  omeprazole (priLOSEC) 40 MG capsule, Take 1 capsule by mouth Daily., Disp: , Rfl:   •  orphenadrine (NORFLEX) 100 MG 12 hr tablet, Take 100 mg by mouth 2 (Two) Times a Day., Disp: , Rfl:   •  Trulicity 0.75 MG/0.5ML solution pen-injector, INJECT 1 PEN EVERY WEEK, Disp: , Rfl:   •  vitamin E 1000 UNIT capsule, Take 1,000 Units by mouth Daily., Disp: , Rfl:     Past Surgical History:   Procedure Laterality Date   • BACK SURGERY Right 01/29/2009    L5-S1 Laminectomy - Dr. Gamez   • COLONOSCOPY  2018   • KIDNEY STONE SURGERY  09/2010   • LUMBAR DISCECTOMY Right 4/1/2021    Procedure: LUMBAR DISCECTOMY L4-5;  Surgeon: Kit Castelan MD;  Location: [facility];  Service: Neurosurgery;  Laterality: Right;   • ROTATOR CUFF REPAIR Right 09/2007   • TUBAL ABDOMINAL LIGATION  02/29/2000       Social History     Socioeconomic History   • Marital status:      Spouse name: Not on file   • Number of children: Not on file   • Years of education: Not on file   • Highest education level: Not on file   Tobacco Use   • Smoking status: Never Smoker   • Smokeless tobacco: Never Used   Vaping Use   • Vaping Use: Never used   Substance and Sexual Activity   • Alcohol use: No   • Drug use: No   • Sexual activity: Defer         Review of Systems   Constitutional: Negative for activity change, appetite change, chills and fever.   HENT: Negative for facial swelling, hearing loss, tinnitus, trouble  "swallowing and voice change.    Eyes: Negative for pain and visual disturbance.   Respiratory: Negative for apnea and shortness of breath.    Cardiovascular: Negative for leg swelling.   Gastrointestinal: Negative for constipation, nausea and vomiting.   Genitourinary: Negative for difficulty urinating and dysuria.   Musculoskeletal: Positive for back pain. Negative for gait problem, joint swelling, neck pain and neck stiffness.   Skin: Negative for color change.   Neurological: Negative for dizziness, facial asymmetry, speech difficulty, weakness and numbness.   Psychiatric/Behavioral: Negative for behavioral problems, confusion, dysphoric mood and sleep disturbance. The patient is not nervous/anxious.        Objective   Vital Signs: Blood pressure 126/80, pulse 102, temperature 97.6 °F (36.4 °C), resp. rate 20, height 170.2 cm (67\"), weight 98.9 kg (218 lb), SpO2 99 %.  Physical Exam  Vitals and nursing note reviewed.   Constitutional:       General: She is not in acute distress.     Appearance: She is well-developed.   HENT:      Head: Normocephalic and atraumatic.   Eyes:      Pupils: Pupils are equal, round, and reactive to light.   Psychiatric:         Behavior: Behavior normal.         Thought Content: Thought content normal.     Musculoskeletal:     Patient having trouble going up on her toes on the right. She is not sure if this is new or not.      Station and gait are normal.  Neurologic:     Muscle tone is normal throughout.     Coordination is intact.     Sensation is intact to light touch throughout.     Patient is oriented to person, place, and time.      Assessment/Plan   Diagnosis: Right L4-5 disc herniation status post discectomy, 4/1/2021    Medical Decision Making: Patient continues with axial low back pain.  She has inquired about trying formal physical therapy which I think is an excellent idea.  I have provided her a note to go to PT pros in [place].  If symptoms do not improve or progress then " she may call our office and we will consider a follow-up with MRI of the lumbar spine with and without gadolinium.        Diagnoses and all orders for this visit:    1. S/P lumbar discectomy (Primary)  -     Ambulatory Referral to Physical Therapy Evaluate and treat    2. Lumbar disc herniation with radiculopathy  -     Ambulatory Referral to Physical Therapy Evaluate and treat    3. DDD (degenerative disc disease), lumbar  -     Ambulatory Referral to Physical Therapy Evaluate and treat    4. Family history of dementia  -     Ambulatory Referral to Physical Therapy Evaluate and treat                      Mary Kenney PA-C  Patient Care Team:  Karl Velazquez MD as PCP - General (Internal Medicine)  Karl Velazquez MD as Referring Physician (Internal Medicine)

## 2021-10-06 ENCOUNTER — HOSPITAL ENCOUNTER (OUTPATIENT)
Dept: MRI IMAGING | Facility: HOSPITAL | Age: 61
Discharge: HOME | End: 2021-10-06
Admitting: PHYSICIAN ASSISTANT

## 2021-10-06 PROCEDURE — 70551 MRI BRAIN STEM W/O DYE: CPT

## 2021-11-15 ENCOUNTER — TELEPHONE (OUTPATIENT)
Dept: NEUROSURGERY | Facility: CLINIC | Age: 61
End: 2021-11-15

## 2021-11-15 DIAGNOSIS — Z98.890 S/P LUMBAR DISCECTOMY: Primary | ICD-10-CM

## 2021-11-15 NOTE — TELEPHONE ENCOUNTER
Provider: Mary MACHADO   Caller: patient  Time of call:   8:53  Phone #:  [phone]  Surgery:  Lumbar discectomy  Surgery Date: 04/01/21   Last visit:  0/04/21  Next visit:     MARIE:         Reason for call:     Patient called and said she is still having issues with her back.  Patient states that Mary MACHADO had agreed to do a MRI of her back if she did not improve.    Patient would like to have a MRI of her back and see Mary MACHADO again.        Per Mary's note from 08/04/21   If symptoms do not improve or progress then she may call our office and we will consider a follow-up with MRI of the lumbar spine with and without gadolinium.

## 2021-11-15 NOTE — TELEPHONE ENCOUNTER
Pt sent mychart msg this AM which I have replied to her asking specific questions regarding her pain.  I will update this message once she responds to me.      Nain Hutchins  to Mary Kenney PA-C        11/15/21 8:46 AM  Could you please schedule a MRI for my continued back pain. I went through therapy but is is now a lot worse. Need ASAP please.   Thank you, Nain Montero  to Nain Hutchins      11/15/21 8:50 AM  Good morning Mrs. Hutchins,      Can you please describe your back pain to me?  Sharp, shooting, dull, aching?  Is it constant or does it come and go?  Does it radiate anywhere?       Do you have problems with your bowel or bladder?      Do you have numbness?  If so, where and is it constant or goes it come and go?       Thank you,      ELINOR Grant, CMA

## 2021-11-29 ENCOUNTER — HOSPITAL ENCOUNTER (OUTPATIENT)
Dept: MRI IMAGING | Facility: HOSPITAL | Age: 61
Discharge: HOME | End: 2021-11-29
Admitting: PHYSICIAN ASSISTANT

## 2021-11-29 DIAGNOSIS — Z98.890 S/P LUMBAR DISCECTOMY: ICD-10-CM

## 2021-11-29 LAB — CREAT BLDA-MCNC: 0.7 MG/DL (ref 0.6–1.3)

## 2021-11-29 PROCEDURE — 72158 MRI LUMBAR SPINE W/O & W/DYE: CPT | Performed by: RADIOLOGY

## 2021-11-29 PROCEDURE — 82565 ASSAY OF CREATININE: CPT

## 2021-11-29 PROCEDURE — 0 GADOBENATE DIMEGLUMINE 529 MG/ML SOLUTION

## 2021-11-29 PROCEDURE — A9577 INJ MULTIHANCE: HCPCS | Performed by: PHYSICIAN ASSISTANT

## 2021-11-29 PROCEDURE — 0 GADOBENATE DIMEGLUMINE 529 MG/ML SOLUTION: Performed by: PHYSICIAN ASSISTANT

## 2021-11-29 PROCEDURE — 72158 MRI LUMBAR SPINE W/O & W/DYE: CPT

## 2021-11-29 PROCEDURE — A9577 INJ MULTIHANCE: HCPCS

## 2021-11-29 RX ADMIN — GADOBENATE DIMEGLUMINE 18 ML: 529 INJECTION, SOLUTION INTRAVENOUS at 15:00

## 2021-12-08 ENCOUNTER — OFFICE VISIT (OUTPATIENT)
Dept: NEUROSURGERY | Facility: CLINIC | Age: 61
End: 2021-12-08

## 2021-12-08 VITALS
SYSTOLIC BLOOD PRESSURE: 120 MMHG | HEIGHT: 67 IN | DIASTOLIC BLOOD PRESSURE: 72 MMHG | WEIGHT: 215.4 LBS | BODY MASS INDEX: 33.81 KG/M2 | TEMPERATURE: 98 F

## 2021-12-08 DIAGNOSIS — Z98.890 S/P LUMBAR DISCECTOMY: ICD-10-CM

## 2021-12-08 DIAGNOSIS — Z81.8 FAMILY HISTORY OF DEMENTIA: ICD-10-CM

## 2021-12-08 DIAGNOSIS — M51.36 DDD (DEGENERATIVE DISC DISEASE), LUMBAR: ICD-10-CM

## 2021-12-08 DIAGNOSIS — M51.16 LUMBAR DISC HERNIATION WITH RADICULOPATHY: Primary | ICD-10-CM

## 2021-12-08 PROCEDURE — 99214 OFFICE O/P EST MOD 30 MIN: CPT | Performed by: PHYSICIAN ASSISTANT

## 2021-12-08 RX ORDER — BROMPHENIRAMINE MALEATE, PSEUDOEPHEDRINE HYDROCHLORIDE, AND DEXTROMETHORPHAN HYDROBROMIDE 2; 30; 10 MG/5ML; MG/5ML; MG/5ML
SYRUP ORAL
COMMUNITY
Start: 2021-11-10

## 2021-12-08 RX ORDER — IBUPROFEN 800 MG/1
TABLET ORAL
COMMUNITY
Start: 2021-11-10 | End: 2022-08-12

## 2021-12-08 RX ORDER — AZITHROMYCIN 250 MG/1
TABLET, FILM COATED ORAL
COMMUNITY
Start: 2021-11-10 | End: 2022-02-15

## 2021-12-08 RX ORDER — ALBUTEROL SULFATE 90 UG/1
AEROSOL, METERED RESPIRATORY (INHALATION)
COMMUNITY
Start: 2021-09-13

## 2021-12-08 NOTE — PROGRESS NOTES
Patient: Nain Hutchins  : 1960  Chart #: [id number]    Date of Service: 21    CHIEF COMPLAINT: Low back and right leg pain    History of Present Illness Ms. Hutchins is seen in follow-up.  She is a 60-year-old woman who presented with severe back and right leg pain that had become unremitting.  Ultimately on 2021 she underwent an uncomplicated right L4-5 decompression and discectomy.  In terms of the leg pain she has been doing very well.  However she has pronounced low back pain that precludes sitting or standing.  She does better when moving.  She went back to work for couple weeks and then had to stop.  When last seen, Dr. Castelan prescribed ibuprofen 400-600 mg 3 times a day regularly. She admits she does not take it regularly. She takes meloxicam on bad days and then takes tylenol every night. Symptoms have not progressed but they have not improved either. She has noticed she cannot go up on her toes on the right. She is not sure if this is new or not.  More recently she called our office complaining of acute worsening mid to low back pain.  She had been doing therapy which was not helping.  An MRI was ordered and she is here today for review.  Today she reports the pain has settled down again.  When she has discomfort, it is localized to the low back.  No pain in her legs whatsoever.  Symptoms are worse when she is sitting.  No symptoms of neurogenic claudication or bowel or bladder difficulties.      Past Medical History:   Diagnosis Date   • Allergic    • Cancer (HCC)     Skin   • Diabetes mellitus (HCC)    • Disease of thyroid gland    • GERD (gastroesophageal reflux disease)    • Hypertension    • Low back pain    • PONV (postoperative nausea and vomiting)    • Sleep apnea     CPAP         Current Outpatient Medications:   •  albuterol sulfate  (90 Base) MCG/ACT inhaler, , Disp: , Rfl:   •  atorvastatin (LIPITOR) 20 MG tablet, Take 20 mg by mouth Every Night., Disp: , Rfl:   •   azithromycin (ZITHROMAX) 250 MG tablet, , Disp: , Rfl:   •  brompheniramine-pseudoephedrine-DM 30-2-10 MG/5ML syrup, , Disp: , Rfl:   •  Cholecalciferol (Vitamin D3) 25 MCG (1000 UT) capsule, Take 1,000 Units by mouth Daily., Disp: , Rfl:   •  clobetasol (TEMOVATE) 0.05 % cream, APPLY PEA SIZED AMOUNT TO AFFECTED AREA TWICE WEEKLY, Disp: , Rfl:   •  fluticasone (FLONASE) 50 MCG/ACT nasal spray, SPRAY 1 SPRAY INTO EACH NOSTRIL TWICE A DAY, Disp: , Rfl:   •  ibuprofen (ADVIL,MOTRIN) 800 MG tablet, , Disp: , Rfl:   •  levothyroxine (SYNTHROID, LEVOTHROID) 50 MCG tablet, Take 50 mcg by mouth Daily., Disp: , Rfl:   •  lisinopril (PRINIVIL,ZESTRIL) 40 MG tablet, Take 40 mg by mouth Daily., Disp: , Rfl:   •  meloxicam (MOBIC) 15 MG tablet, Take 1 tablet by mouth Daily., Disp: 30 tablet, Rfl: 2  •  metFORMIN ER (GLUCOPHAGE-XR) 500 MG 24 hr tablet, Take 1 tablet by mouth Daily., Disp: 3 tablet, Rfl: 0  •  metoprolol succinate XL (TOPROL-XL) 100 MG 24 hr tablet, Take 100 mg by mouth Daily., Disp: , Rfl:   •  omeprazole (priLOSEC) 40 MG capsule, Take 1 capsule by mouth Daily., Disp: , Rfl:   •  orphenadrine (NORFLEX) 100 MG 12 hr tablet, Take 100 mg by mouth 2 (Two) Times a Day., Disp: , Rfl:   •  Trulicity 0.75 MG/0.5ML solution pen-injector, INJECT 1 PEN EVERY WEEK, Disp: , Rfl:   •  vitamin E 1000 UNIT capsule, Take 1,000 Units by mouth Daily., Disp: , Rfl:     Past Surgical History:   Procedure Laterality Date   • BACK SURGERY Right 01/29/2009    L5-S1 Laminectomy - Dr. Gamez   • COLONOSCOPY  2018   • KIDNEY STONE SURGERY  09/2010   • LUMBAR DISCECTOMY Right 4/1/2021    Procedure: LUMBAR DISCECTOMY L4-5;  Surgeon: Kit Castelan MD;  Location: [facility];  Service: Neurosurgery;  Laterality: Right;   • ROTATOR CUFF REPAIR Right 09/2007   • TUBAL ABDOMINAL LIGATION  02/29/2000       Social History     Socioeconomic History   • Marital status:    Tobacco Use   • Smoking status: Never Smoker   • Smokeless  "tobacco: Never Used   Vaping Use   • Vaping Use: Never used   Substance and Sexual Activity   • Alcohol use: No   • Drug use: No   • Sexual activity: Defer         Review of Systems   Constitutional: Negative for activity change, appetite change, chills and fever.   HENT: Negative for facial swelling, hearing loss, tinnitus, trouble swallowing and voice change.    Eyes: Negative for pain and visual disturbance.   Respiratory: Negative for apnea and shortness of breath.    Cardiovascular: Negative for leg swelling.   Gastrointestinal: Negative for constipation, nausea and vomiting.   Genitourinary: Negative for difficulty urinating and dysuria.   Musculoskeletal: Positive for back pain. Negative for gait problem, joint swelling, neck pain and neck stiffness.   Skin: Negative for color change.   Neurological: Negative for dizziness, facial asymmetry, speech difficulty, weakness and numbness.   Psychiatric/Behavioral: Negative for behavioral problems, confusion, dysphoric mood and sleep disturbance. The patient is not nervous/anxious.        Objective   Vital Signs: Blood pressure 120/72, temperature 98 °F (36.7 °C), temperature source Infrared, height 170.2 cm (67\"), weight 97.7 kg (215 lb 6.4 oz).  Physical Exam  Vitals and nursing note reviewed.   Constitutional:       General: She is not in acute distress.     Appearance: She is well-developed.   HENT:      Head: Normocephalic and atraumatic.   Eyes:      Pupils: Pupils are equal, round, and reactive to light.   Psychiatric:         Behavior: Behavior normal.         Thought Content: Thought content normal.   Musculoskeletal:     Patient having trouble going up on her toes on the right. She is not sure if this is new or not.      Station and gait are normal.  Neurologic:     Muscle tone is normal throughout.     Coordination is intact.     Sensation is intact to light touch throughout.     Patient is oriented to person, place, and time.    Independent review of " radiographic findings: MRI of the lumbar spine dated 11/29/2021 demonstrates diffuse degenerative disc disease.  There is a large disc herniation at L3-4, centrally, resulting in moderate central stenosis.  This is slightly worse in interval when compared to previous MRI.  Postoperative changes at L for-5 on the right from previous discectomy.    Assessment/Plan   Diagnosis:   1.  Degenerative disc disease with mechanical back pain  2.  Right L4-5 disc herniation status post discectomy, 4/1/2021    Medical Decision Making: Patient had a recent flareup of low back pain related to degenerative disc disease.  Fortunately her symptoms have settled down.  She is not taking any medications.  She will be very careful with her activities.  She will follow-up with me in 2 months to check on things.  If she has new or worsening symptoms in the interim she will notify our office.    Diagnoses and all orders for this visit:    1. Lumbar disc herniation with radiculopathy (Primary)    2. DDD (degenerative disc disease), lumbar    3. Family history of dementia    4. S/P lumbar discectomy                      Mary Kenney PA-C  Patient Care Team:  Karl Velazquez MD as PCP - General (Internal Medicine)  Karl Velazquez MD as Referring Physician (Internal Medicine)

## 2022-02-09 ENCOUNTER — OFFICE VISIT (OUTPATIENT)
Dept: NEUROSURGERY | Facility: CLINIC | Age: 62
End: 2022-02-09

## 2022-02-09 VITALS
DIASTOLIC BLOOD PRESSURE: 80 MMHG | BODY MASS INDEX: 33.27 KG/M2 | TEMPERATURE: 97.3 F | HEIGHT: 67 IN | SYSTOLIC BLOOD PRESSURE: 124 MMHG | WEIGHT: 212 LBS

## 2022-02-09 DIAGNOSIS — M51.36 DDD (DEGENERATIVE DISC DISEASE), LUMBAR: ICD-10-CM

## 2022-02-09 DIAGNOSIS — M51.16 LUMBAR DISC HERNIATION WITH RADICULOPATHY: Primary | ICD-10-CM

## 2022-02-09 DIAGNOSIS — Z81.8 FAMILY HISTORY OF DEMENTIA: ICD-10-CM

## 2022-02-09 DIAGNOSIS — Z98.890 S/P LUMBAR DISCECTOMY: ICD-10-CM

## 2022-02-09 PROCEDURE — 99213 OFFICE O/P EST LOW 20 MIN: CPT | Performed by: PHYSICIAN ASSISTANT

## 2022-02-09 RX ORDER — DULAGLUTIDE 1.5 MG/.5ML
INJECTION, SOLUTION SUBCUTANEOUS
COMMUNITY
Start: 2021-12-10

## 2022-02-09 NOTE — PROGRESS NOTES
Patient: Nain Hutchins  : 1960  Chart #: [id number]    Date of Service: 22    CHIEF COMPLAINT: Low back and right leg pain    History of Present Illness Ms. Hutchins is seen in follow-up.  She is a 61-year-old woman who presented with severe back and right leg pain that had become unremitting.  Ultimately on 2021 she underwent an uncomplicated right L4-5 decompression and discectomy.  In terms of the leg pain she has been doing very well.  She had a flareup of isolated low back pain last fall felt to be related to her degenerative disc disease.  No radicular complaints.  She was treated with meloxicam and Tylenol.  Fortunately her pain is better.  Rarely she will take some medicine at bedtime.  She is very careful with her activities.  No new complaints today.        Past Medical History:   Diagnosis Date   • Allergic    • Cancer (HCC)     Skin   • Diabetes mellitus (HCC)    • Disease of thyroid gland    • GERD (gastroesophageal reflux disease)    • Hypertension    • Low back pain    • PONV (postoperative nausea and vomiting)    • Sleep apnea     CPAP         Current Outpatient Medications:   •  albuterol sulfate  (90 Base) MCG/ACT inhaler, , Disp: , Rfl:   •  atorvastatin (LIPITOR) 20 MG tablet, Take 20 mg by mouth Every Night., Disp: , Rfl:   •  azithromycin (ZITHROMAX) 250 MG tablet, , Disp: , Rfl:   •  brompheniramine-pseudoephedrine-DM 30-2-10 MG/5ML syrup, , Disp: , Rfl:   •  Cholecalciferol (Vitamin D3) 25 MCG (1000 UT) capsule, Take 1,000 Units by mouth Daily., Disp: , Rfl:   •  clobetasol (TEMOVATE) 0.05 % cream, APPLY PEA SIZED AMOUNT TO AFFECTED AREA TWICE WEEKLY, Disp: , Rfl:   •  fluticasone (FLONASE) 50 MCG/ACT nasal spray, SPRAY 1 SPRAY INTO EACH NOSTRIL TWICE A DAY, Disp: , Rfl:   •  ibuprofen (ADVIL,MOTRIN) 800 MG tablet, , Disp: , Rfl:   •  levothyroxine (SYNTHROID, LEVOTHROID) 50 MCG tablet, Take 50 mcg by mouth Daily., Disp: , Rfl:   •  lisinopril (PRINIVIL,ZESTRIL) 40 MG  tablet, Take 40 mg by mouth Daily., Disp: , Rfl:   •  meloxicam (MOBIC) 15 MG tablet, Take 1 tablet by mouth Daily., Disp: 30 tablet, Rfl: 2  •  metFORMIN ER (GLUCOPHAGE-XR) 500 MG 24 hr tablet, Take 1 tablet by mouth Daily., Disp: 3 tablet, Rfl: 0  •  metoprolol succinate XL (TOPROL-XL) 100 MG 24 hr tablet, Take 100 mg by mouth Daily., Disp: , Rfl:   •  omeprazole (priLOSEC) 40 MG capsule, Take 1 capsule by mouth Daily., Disp: , Rfl:   •  orphenadrine (NORFLEX) 100 MG 12 hr tablet, Take 100 mg by mouth 2 (Two) Times a Day., Disp: , Rfl:   •  Trulicity 1.5 MG/0.5ML solution pen-injector, INJECT 1 PEN ONCE A WEEK, Disp: , Rfl:   •  vitamin E 1000 UNIT capsule, Take 1,000 Units by mouth Daily., Disp: , Rfl:     Past Surgical History:   Procedure Laterality Date   • BACK SURGERY Right 01/29/2009    L5-S1 Laminectomy - Dr. Gamez   • COLONOSCOPY  2018   • KIDNEY STONE SURGERY  09/2010   • LUMBAR DISCECTOMY Right 4/1/2021    Procedure: LUMBAR DISCECTOMY L4-5;  Surgeon: Kit Castelan MD;  Location: [facility];  Service: Neurosurgery;  Laterality: Right;   • ROTATOR CUFF REPAIR Right 09/2007   • TUBAL ABDOMINAL LIGATION  02/29/2000       Social History     Socioeconomic History   • Marital status:    Tobacco Use   • Smoking status: Never Smoker   • Smokeless tobacco: Never Used   Vaping Use   • Vaping Use: Never used   Substance and Sexual Activity   • Alcohol use: No   • Drug use: No   • Sexual activity: Defer         Review of Systems   Constitutional: Negative for activity change, appetite change, chills and fever.   HENT: Negative for facial swelling, hearing loss, tinnitus, trouble swallowing and voice change.    Eyes: Negative for pain and visual disturbance.   Respiratory: Negative for apnea and shortness of breath.    Cardiovascular: Negative for leg swelling.   Gastrointestinal: Negative for constipation, nausea and vomiting.   Genitourinary: Negative for difficulty urinating and dysuria.  "  Musculoskeletal: Positive for back pain. Negative for gait problem, joint swelling, neck pain and neck stiffness.   Skin: Negative for color change.   Neurological: Negative for dizziness, facial asymmetry, speech difficulty, weakness and numbness.   Psychiatric/Behavioral: Negative for behavioral problems, confusion, dysphoric mood and sleep disturbance. The patient is not nervous/anxious.        Objective   Vital Signs: Blood pressure 124/80, temperature 97.3 °F (36.3 °C), temperature source Infrared, height 170.2 cm (67\"), weight 96.2 kg (212 lb).  Physical Exam  Vitals and nursing note reviewed.   Constitutional:       General: She is not in acute distress.     Appearance: She is well-developed.   HENT:      Head: Normocephalic and atraumatic.   Eyes:      Pupils: Pupils are equal, round, and reactive to light.   Psychiatric:         Behavior: Behavior normal.         Thought Content: Thought content normal.   Musculoskeletal:     Patient having trouble going up on her toes on the right. She is not sure if this is new or not.      Station and gait are normal.  Neurologic:     Muscle tone is normal throughout.     Coordination is intact.     Sensation is intact to light touch throughout.     Patient is oriented to person, place, and time.    Independent review of radiographic findings: MRI of the lumbar spine dated 11/29/2021 demonstrates diffuse degenerative disc disease.  There is a large disc herniation at L3-4, centrally, resulting in moderate central stenosis.  This is slightly worse in interval when compared to previous MRI.  Postoperative changes at L for-5 on the right from previous discectomy.    Assessment/Plan   Diagnosis:   1.  Degenerative disc disease with mechanical back pain  2.  Right L4-5 disc herniation status post discectomy, 4/1/2021    Medical Decision Making: Patient is managing. We reviewed her MRI again together and discussed the nature of degenerative disc disease.  This seems to run " in her family.  She may continue meloxicam and/or Tylenol as needed for back pain on bad days.  She is very mindful of the types of activity she does.  She will call me if things flare up again in the future and I would be happy to see her to reevaluate.       Diagnoses and all orders for this visit:    1. Lumbar disc herniation with radiculopathy (Primary)    2. DDD (degenerative disc disease), lumbar    3. S/P lumbar discectomy    4. Family history of dementia                      Mary Kenney PA-C  Patient Care Team:  Karl Velazquez MD as PCP - General (Internal Medicine)  Karl Velazquez MD as Referring Physician (Internal Medicine)

## 2022-02-15 ENCOUNTER — OFFICE VISIT (OUTPATIENT)
Dept: NEUROLOGY | Facility: CLINIC | Age: 62
End: 2022-02-15

## 2022-02-15 DIAGNOSIS — R41.3 MEMORY LOSS: Primary | ICD-10-CM

## 2022-02-15 PROCEDURE — 99214 OFFICE O/P EST MOD 30 MIN: CPT | Performed by: PHYSICIAN ASSISTANT

## 2022-02-15 NOTE — PROGRESS NOTES
Subjective         Chief Complaint: memory loss      History of Present Illness   Nain Hutchins is a 61 y.o. female who returns to clinic today for evaluation of memory loss. She has noted symptoms for at least several years marked initially by forgetfulness and word-finding difficulties. This has worsened  over time, particularly after she contracted COVID-19 in 11/20. Additional symptoms have included impairments in both short and long term memory as well as executive function. There have been associated symptoms of anxiety and depression. She denies impairments in ADL's. She is out on leave at her current job as an , primarily due to ongoing back pain and her recent surgery though she was having difficulty performing her job duties due to her cognitive issues. She manages her medications  and finances. She continues to drive. She is currently residing with family.      Family history: her mother had a history of dementia, initially developing symptoms in her late 60's.    Prior evaluation has included blood work and an MRI of the brain, which was unremarkable.     Today: Since her last visit in 7/21, she feels essentially unchanged.       I have reviewed and confirmed the past family, social and medical history as accurate on 2/15/22.     Review of Systems   Constitutional: Negative.    HENT: Negative.    Eyes: Negative.    Respiratory: Negative.    Cardiovascular: Negative.    Gastrointestinal: Negative.    Endocrine: Negative.    Genitourinary: Negative.    Musculoskeletal: Negative.    Skin: Negative.    Allergic/Immunologic: Negative.    Hematological: Negative.        Objective     General appearance today is normal.         Physical Exam  Neurological:      Mental Status: She is oriented to person, place, and time.      Coordination: Finger-Nose-Finger Test normal.      Deep Tendon Reflexes: Strength normal.   Psychiatric:         Speech: Speech normal.          Neurologic Exam     Mental  Status   Oriented to person, place, and time.   Registration: recalls 3 of 3 objects. Recall at 5 minutes: recalls 3 of 3 objects. Follows 3 step commands.   Attention: normal.   Speech: speech is normal   Level of consciousness: alert  Able to name object. Able to read. Able to repeat. Able to write. Normal comprehension.     Cranial Nerves   Cranial nerves II through XII intact.     Motor Exam   Muscle bulk: normal  Overall muscle tone: normal    Strength   Strength 5/5 throughout.     Sensory Exam   Light touch normal.     Gait, Coordination, and Reflexes     Coordination   Finger to nose coordination: normal    Tremor   Resting tremor: absent        Results  MMSE=30  MoCA= 26 (in 7/21)       Assessment/Plan   Diagnoses and all orders for this visit:    1. Memory loss (Primary)  -     Ambulatory Referral to Neuropsychology          Discussion/Summary   Nain Hutchins returns to clinic today for evaluation of memory loss . Her history and examination today, including cognitive bedside testing is somewhat concerning for [facility]. However, I remain concerned that her history of anxiety and chronic pain may be negatively affecting her cognition. This was discussed. It was elected to obtain neuropsychological testing . After discussing potential treatment options, it was not elected to add any cognitive enhancers at this time. She will then follow up in 1 year, or sooner if needed.   Total time of visit today: 30 minutes. As part of this visit I obtained additional history from the family which is incorporated in the HPI. I also discussed diagnosis, prognosis, diagnostic testing, evaluation, current status, treatment options and management as discussed above.         Jessica Gonzalez PA-C

## 2022-07-14 ENCOUNTER — APPOINTMENT (OUTPATIENT)
Dept: WOMENS IMAGING | Facility: HOSPITAL | Age: 62
End: 2022-07-14

## 2022-07-14 PROCEDURE — 77063 BREAST TOMOSYNTHESIS BI: CPT | Performed by: RADIOLOGY

## 2022-07-14 PROCEDURE — 77067 SCR MAMMO BI INCL CAD: CPT | Performed by: RADIOLOGY

## 2022-08-12 DIAGNOSIS — Z98.890 S/P LUMBAR DISCECTOMY: Primary | ICD-10-CM

## 2022-08-12 RX ORDER — MELOXICAM 7.5 MG/1
7.5 TABLET ORAL DAILY
Qty: 30 TABLET | Refills: 0 | Status: SHIPPED | OUTPATIENT
Start: 2022-08-12

## 2022-08-12 NOTE — TELEPHONE ENCOUNTER
Provider:  Flip  Surgery/Procedure: Rt L4-5 disc  Surgery/Procedure Date:  04/01/2021  Last visit:   Office Visit with Anne Marie, Mary Mcmullen PA-C (02/09/2022)  Next visit: PRN     Reason for call:   ---- Message from Katie Cerda sent at 8/12/2022  9:30 AM EDT -----  Regarding: Refill   Hello, I was wondering if you would please send in a refill for meloxicam?   Still having some back pain. I only use it  when it gets really bad.   Thanks, katie cerda  [place] [address]

## 2023-09-29 ENCOUNTER — OFFICE VISIT (OUTPATIENT)
Dept: CARDIOLOGY | Facility: CLINIC | Age: 63
End: 2023-09-29
Payer: MEDICARE

## 2023-09-29 VITALS
HEART RATE: 72 BPM | BODY MASS INDEX: 32.21 KG/M2 | HEIGHT: 67 IN | OXYGEN SATURATION: 99 % | WEIGHT: 205.2 LBS | SYSTOLIC BLOOD PRESSURE: 126 MMHG | DIASTOLIC BLOOD PRESSURE: 72 MMHG

## 2023-09-29 DIAGNOSIS — R07.2 PRECORDIAL PAIN: ICD-10-CM

## 2023-09-29 DIAGNOSIS — I10 PRIMARY HYPERTENSION: ICD-10-CM

## 2023-09-29 DIAGNOSIS — R06.09 DYSPNEA ON EXERTION: Primary | ICD-10-CM

## 2023-09-29 PROCEDURE — 1159F MED LIST DOCD IN RCRD: CPT | Performed by: PHYSICIAN ASSISTANT

## 2023-09-29 PROCEDURE — 99204 OFFICE O/P NEW MOD 45 MIN: CPT | Performed by: PHYSICIAN ASSISTANT

## 2023-09-29 PROCEDURE — 1160F RVW MEDS BY RX/DR IN RCRD: CPT | Performed by: PHYSICIAN ASSISTANT

## 2023-09-29 PROCEDURE — 93000 ELECTROCARDIOGRAM COMPLETE: CPT | Performed by: PHYSICIAN ASSISTANT

## 2023-09-29 RX ORDER — MONTELUKAST SODIUM 10 MG/1
10 TABLET ORAL NIGHTLY
COMMUNITY
Start: 2023-09-05

## 2023-09-29 RX ORDER — CELECOXIB 200 MG/1
200 CAPSULE ORAL DAILY PRN
COMMUNITY
Start: 2023-09-15

## 2023-09-29 NOTE — PROGRESS NOTES
Subjective   Nain Hutchins is a 63 y.o. female     Chief Complaint   Patient presents with   • Establish Care   • Shortness of Breath     Cardiac eval - chest pressure       HPI  The patient presents in the clinic today to establish cardiovascular care.  She is referred primarily in the setting of chest tightness and dyspnea.  There is concern because of risk factors for CAD.  The concern is that her symptoms are anginal equivalent issues.  The patient denies established cardiovascular history.  She has been followed with her primary care provider because of hypertension, dyslipidemia, diabetes mellitus, and issues otherwise as outlined below.  She is specifically concerned because of her significant family history of CAD.  Given all the above she has been referred on for evaluation.  She clinically reports dyspnea which has progressed over the last few weeks, even 6 months or so.  She recently has noted some degree of chest tightness with increasing exertion.  When she tries to be active outside, she almost immediately has to rest because of her degree of dyspnea and chest tightness as of recent.  She has no associated PND orthopnea.  She has had very minimal lower extremity edema.  She never experiences palpitations of any significance.  She has no dizziness nor syncope.  She is concerned because of above symptoms and would like to pursue evaluation.  She presents in that setting.  She has no further complaints.      Current Outpatient Medications   Medication Sig Dispense Refill   • albuterol sulfate  (90 Base) MCG/ACT inhaler      • atorvastatin (LIPITOR) 20 MG tablet Take 1 tablet by mouth Every Night.     • celecoxib (CeleBREX) 200 MG capsule Take 1 capsule by mouth Daily As Needed.     • Cholecalciferol (Vitamin D3) 25 MCG (1000 UT) capsule Take 1 capsule by mouth Daily.     • clobetasol (TEMOVATE) 0.05 % cream APPLY PEA SIZED AMOUNT TO AFFECTED AREA TWICE WEEKLY     • Diclofenac Sodium (VOLTAREN) 1  % gel gel Apply 4 g topically to the appropriate area as directed 2 (Two) Times a Day.     • fluticasone (FLONASE) 50 MCG/ACT nasal spray SPRAY 1 SPRAY INTO EACH NOSTRIL TWICE A DAY     • levothyroxine (SYNTHROID, LEVOTHROID) 50 MCG tablet Take 1 tablet by mouth Daily.     • lisinopril (PRINIVIL,ZESTRIL) 40 MG tablet Take 1 tablet by mouth Daily.     • metFORMIN ER (GLUCOPHAGE-XR) 500 MG 24 hr tablet Take 1 tablet by mouth Daily. 3 tablet 0   • metoprolol succinate XL (TOPROL-XL) 100 MG 24 hr tablet Take 1 tablet by mouth Daily.     • montelukast (SINGULAIR) 10 MG tablet Take 1 tablet by mouth Every Night.     • omeprazole (priLOSEC) 40 MG capsule Take 1 capsule by mouth Daily.     • vitamin E 1000 UNIT capsule Take 1 capsule by mouth Daily.       No current facility-administered medications for this visit.       Patient has no known allergies.    Past Medical History:   Diagnosis Date   • Allergic    • Cancer     Skin   • Diabetes mellitus    • Disease of thyroid gland    • GERD (gastroesophageal reflux disease)    • Hypertension    • Low back pain    • Memory loss    • PONV (postoperative nausea and vomiting)    • Sleep apnea     CPAP       Social History     Socioeconomic History   • Marital status:    Tobacco Use   • Smoking status: Never   • Smokeless tobacco: Never   Vaping Use   • Vaping Use: Never used   Substance and Sexual Activity   • Alcohol use: No   • Drug use: No   • Sexual activity: Defer       Family History   Problem Relation Age of Onset   • Heart attack Mother    • Dementia Mother    • Heart disease Father    • Diabetes Father        Review of Systems   Constitutional: Negative.  Negative for chills, diaphoresis, fatigue and fever.   HENT:  Positive for postnasal drip.    Eyes: Negative.  Negative for visual disturbance.   Respiratory:  Positive for apnea (wears c-pap), chest tightness and shortness of breath. Negative for cough and wheezing.    Cardiovascular:  Positive for chest pain  "(pressure). Negative for palpitations and leg swelling.   Gastrointestinal: Negative.  Negative for abdominal pain and blood in stool.   Endocrine: Negative.    Genitourinary: Negative.  Negative for hematuria.   Musculoskeletal:  Positive for arthralgias (hands) and neck pain. Negative for back pain, myalgias and neck stiffness.   Skin: Negative.  Negative for rash and wound.   Allergic/Immunologic: Positive for environmental allergies (seasonal). Negative for food allergies.   Neurological: Negative.  Negative for dizziness, syncope, weakness, light-headedness, numbness and headaches.   Hematological: Negative.  Does not bruise/bleed easily.   Psychiatric/Behavioral:  Positive for sleep disturbance.      Objective     Vitals:    09/29/23 1137   BP: 126/72   BP Location: Right arm   Patient Position: Sitting   Cuff Size: Adult   Pulse: 72   SpO2: 99%   Weight: 93.1 kg (205 lb 3.2 oz)   Height: 170.2 cm (67\")        /72 (BP Location: Right arm, Patient Position: Sitting, Cuff Size: Adult)   Pulse 72   Ht 170.2 cm (67\")   Wt 93.1 kg (205 lb 3.2 oz)   SpO2 99%   BMI 32.14 kg/m²      Lab Results (most recent)       None            Physical Exam  Vitals and nursing note reviewed.   Constitutional:       General: She is not in acute distress.     Appearance: She is well-developed.   HENT:      Head: Normocephalic and atraumatic.   Eyes:      Conjunctiva/sclera: Conjunctivae normal.      Pupils: Pupils are equal, round, and reactive to light.   Neck:      Vascular: No JVD.      Trachea: No tracheal deviation.   Cardiovascular:      Rate and Rhythm: Normal rate and regular rhythm.      Heart sounds: Normal heart sounds.   Pulmonary:      Effort: Pulmonary effort is normal.      Breath sounds: Normal breath sounds.   Abdominal:      General: Bowel sounds are normal. There is no distension.      Palpations: Abdomen is soft. There is no mass.      Tenderness: There is no abdominal tenderness. There is no guarding " or rebound.   Musculoskeletal:         General: No tenderness or deformity. Normal range of motion.      Cervical back: Normal range of motion and neck supple.   Skin:     General: Skin is warm and dry.      Coloration: Skin is not pale.      Findings: No erythema or rash.   Neurological:      Mental Status: She is alert and oriented to person, place, and time.   Psychiatric:         Behavior: Behavior normal.         Thought Content: Thought content normal.         Judgment: Judgment normal.       Procedure     ECG 12 Lead    Date/Time: 9/29/2023 11:46 AM  Performed by: Gautam Zarate PA  Authorized by: Gautam Zarate PA   Comparison: not compared with previous ECG   Comments: Sinus rhythm, rate 56, normal axis, no acute changes noted.             Assessment & Plan      Diagnosis Plan   1. Dyspnea on exertion  Adult Transthoracic Echo Complete W/ Cont if Necessary Per Protocol    Stress Test With Myocardial Perfusion One Day      2. Precordial pain  Adult Transthoracic Echo Complete W/ Cont if Necessary Per Protocol    Stress Test With Myocardial Perfusion One Day      3. Primary hypertension  Adult Transthoracic Echo Complete W/ Cont if Necessary Per Protocol    Stress Test With Myocardial Perfusion One Day        1.  The patient presents because of symptoms of dyspnea, chest tightness, and issues otherwise, coupled with risk factors as above.  She is concerned also because of her significant family history.  She presents for evaluation in the setting.    2.  I would schedule for nuclear stress test for ischemia assessment.    3.  We will schedule for an echo to evaluate systolic diastolic parameters as well as valvular morphologies otherwise.    4.  For now, the patient is on appropriate medications.  Blood pressures appear well controlled.  She appears well treated otherwise.  We will make no adjustments in regimen.    5.  We will see her back to discuss results of the above and recommend further.  She  will call for any issues prior to follow-up.             Electronically signed by:

## 2023-11-06 ENCOUNTER — HOSPITAL ENCOUNTER (OUTPATIENT)
Dept: CARDIOLOGY | Facility: HOSPITAL | Age: 63
Discharge: HOME | End: 2023-11-06
Payer: MEDICARE

## 2023-11-06 VITALS — HEIGHT: 67 IN | BODY MASS INDEX: 32.21 KG/M2 | WEIGHT: 205.25 LBS

## 2023-11-06 DIAGNOSIS — I10 PRIMARY HYPERTENSION: ICD-10-CM

## 2023-11-06 DIAGNOSIS — R06.09 DYSPNEA ON EXERTION: ICD-10-CM

## 2023-11-06 DIAGNOSIS — R07.2 PRECORDIAL PAIN: ICD-10-CM

## 2023-11-06 PROCEDURE — 0 TECHNETIUM SESTAMIBI: Performed by: INTERNAL MEDICINE

## 2023-11-06 PROCEDURE — 93017 CV STRESS TEST TRACING ONLY: CPT

## 2023-11-06 PROCEDURE — 93306 TTE W/DOPPLER COMPLETE: CPT

## 2023-11-06 PROCEDURE — A9500 TC99M SESTAMIBI: HCPCS | Performed by: INTERNAL MEDICINE

## 2023-11-06 PROCEDURE — 78452 HT MUSCLE IMAGE SPECT MULT: CPT

## 2023-11-06 RX ORDER — REGADENOSON 0.08 MG/ML
0.4 INJECTION, SOLUTION INTRAVENOUS
Status: DISCONTINUED | OUTPATIENT
Start: 2023-11-06 | End: 2023-11-07 | Stop reason: HOSPADM

## 2023-11-06 RX ADMIN — TECHNETIUM TC 99M SESTAMIBI 1 DOSE: 1 INJECTION INTRAVENOUS at 08:54

## 2023-11-06 RX ADMIN — TECHNETIUM TC 99M SESTAMIBI 1 DOSE: 1 INJECTION INTRAVENOUS at 10:32

## 2023-11-11 LAB
BH CV REST NUCLEAR ISOTOPE DOSE: 10 MCI
BH CV STRESS NUCLEAR ISOTOPE DOSE: 30 MCI
BH CV STRESS RECOVERY BP: NORMAL MMHG
BH CV STRESS RECOVERY HR: 61 BPM
MAXIMAL PREDICTED HEART RATE: 158 BPM
PERCENT MAX PREDICTED HR: 82.91 %
STRESS BASELINE BP: NORMAL MMHG
STRESS BASELINE HR: 52 BPM
STRESS PERCENT HR: 98 %
STRESS POST ESTIMATED WORKLOAD: 7 METS
STRESS POST EXERCISE DUR MIN: 6 MIN
STRESS POST EXERCISE DUR SEC: 40 SEC
STRESS POST PEAK BP: NORMAL MMHG
STRESS POST PEAK HR: 131 BPM
STRESS TARGET HR: 134 BPM

## 2023-11-12 LAB
AORTIC DIMENSIONLESS INDEX: 0.97 (DI)
BH CV ECHO MEAS - ACS: 1.73 CM
BH CV ECHO MEAS - AO MAX PG: 5.4 MMHG
BH CV ECHO MEAS - AO MEAN PG: 2.6 MMHG
BH CV ECHO MEAS - AO ROOT DIAM: 2.7 CM
BH CV ECHO MEAS - AO V2 MAX: 115.9 CM/SEC
BH CV ECHO MEAS - AO V2 VTI: 28.2 CM
BH CV ECHO MEAS - EDV(CUBED): 104.1 ML
BH CV ECHO MEAS - EF(MOD-BP): 61 %
BH CV ECHO MEAS - ESV(CUBED): 31.4 ML
BH CV ECHO MEAS - FS: 32.9 %
BH CV ECHO MEAS - IVS/LVPW: 0.9 CM
BH CV ECHO MEAS - IVSD: 1.01 CM
BH CV ECHO MEAS - LA DIMENSION: 3.9 CM
BH CV ECHO MEAS - LAT PEAK E' VEL: 10.6 CM/SEC
BH CV ECHO MEAS - LV MASS(C)D: 180.6 GRAMS
BH CV ECHO MEAS - LV MAX PG: 5.1 MMHG
BH CV ECHO MEAS - LV MEAN PG: 2.17 MMHG
BH CV ECHO MEAS - LV V1 MAX: 113.4 CM/SEC
BH CV ECHO MEAS - LV V1 VTI: 25.9 CM
BH CV ECHO MEAS - LVIDD: 4.7 CM
BH CV ECHO MEAS - LVIDS: 3.2 CM
BH CV ECHO MEAS - LVPWD: 1.13 CM
BH CV ECHO MEAS - MED PEAK E' VEL: 7.6 CM/SEC
BH CV ECHO MEAS - MV A MAX VEL: 53.1 CM/SEC
BH CV ECHO MEAS - MV DEC SLOPE: 397.2 CM/SEC2
BH CV ECHO MEAS - MV DEC TIME: 0.26 SEC
BH CV ECHO MEAS - MV E MAX VEL: 75.3 CM/SEC
BH CV ECHO MEAS - MV E/A: 1.42
BH CV ECHO MEAS - MV MAX PG: 2.5 MMHG
BH CV ECHO MEAS - MV MEAN PG: 1.11 MMHG
BH CV ECHO MEAS - MV P1/2T: 65.5 MSEC
BH CV ECHO MEAS - MV V2 VTI: 30.5 CM
BH CV ECHO MEAS - MVA(P1/2T): 3.4 CM2
BH CV ECHO MEAS - PA V2 MAX: 97.6 CM/SEC
BH CV ECHO MEAS - RAP SYSTOLE: 8 MMHG
BH CV ECHO MEAS - RV MAX PG: 1.55 MMHG
BH CV ECHO MEAS - RV V1 MAX: 62.2 CM/SEC
BH CV ECHO MEAS - RV V1 VTI: 15.9 CM
BH CV ECHO MEAS - RVDD: 3.6 CM
BH CV ECHO MEAS - RVSP: 11.2 MMHG
BH CV ECHO MEAS - TAPSE (>1.6): 1.97 CM
BH CV ECHO MEAS - TR MAX PG: 3.2 MMHG
BH CV ECHO MEAS - TR MAX VEL: 89.8 CM/SEC
BH CV ECHO MEASUREMENTS AVERAGE E/E' RATIO: 8.27
BH CV XLRA - TDI S': 10.1 CM/SEC
SINUS: 2.9 CM

## 2023-11-14 ENCOUNTER — TELEPHONE (OUTPATIENT)
Dept: CARDIOLOGY | Facility: CLINIC | Age: 63
End: 2023-11-14
Payer: MEDICARE

## 2023-11-14 NOTE — TELEPHONE ENCOUNTER
Patient aware of recommendations, keep routine follow up.          ----- Message from Padmini Randolph sent at 11/14/2023 11:25 AM EST -----    ----- Message -----  From: Komal Benton MA  Sent: 11/13/2023  12:22 PM EST  To: KOKO Tyler      ----- Message -----  From: Gautam Zarate PA  Sent: 11/12/2023   5:12 PM EST  To: Komal Benton MA    Routine follow-up.  ----- Message -----  From: Zander Lux MD  Sent: 11/11/2023  10:33 AM EST  To: JEANNETTE Dyer    Stress Test With Myocardial Perfusion One Day  Order: [id number]  Status: Final result       Visible to patient: Yes (not seen)       Dx: Precordial pain; Primary hypertension...    0 Result Notes  Details    Reading Physician Reading Date Result Priority   Zander Lux MD  [phone] 11/11/2023 Routine   Zander Lux MD  [phone] 11/11/2023      Result Text  1.  Adequate functional capacity without chest pain.  The patient exercised 6 minutes and 40 seconds on a Chris protocol to a heart rate of 131, systolic blood pressure of 206, and O2 consumption of 7 METS, and to 82% of age adjusted maximum predicted heart rate.     2.  Physiologic heart rate and blood pressure responses to exercise.  Exercise was stopped at patient request because of fatigue.     3.  No EKG evidence of ischemia.  No exercise-induced dysrhythmia.     4.  No scintigraphic evidence of ischemia.     5.  Preserved post stress ejection fraction of 61% with no focal wall motion abnormalities.     6.  No evidence of transient ischemic dilation or of increased lung uptake of radiopharmaceutical.

## 2023-11-14 NOTE — TELEPHONE ENCOUNTER
Patient aware of recommendations, keep routine follow up.    Almita Nguyen MA        ----- Message from Padmini Randolph sent at 11/14/2023 11:25 AM EST -----    ----- Message -----  From: Komal Benton MA  Sent: 11/13/2023  12:22 PM EST  To: KOKO Tyler      ----- Message -----  From: Gautam Zarate PA  Sent: 11/12/2023   5:11 PM EST  To: Komal Benton MA    Routine follow-up.  ----- Message -----  From: Zander Lux MD  Sent: 11/12/2023   3:38 PM EST  To: JEANNETTE Dyer    Adult Transthoracic Echo Complete W/ Cont if Necessary Per Protocol  Order: [id number]  Status: Final result       Visible to patient: Yes (not seen)       Dx: Precordial pain; Primary hypertension...    0 Result Notes  Details    Reading Physician Reading Date Result Priority   Zander Lux MD  [phone] 11/12/2023 Routine     Result Text       Estimated right ventricular systolic pressure from tricuspid regurgitation is normal (<35 mmHg).     Technically adequate study.     1.  LV size, function, and wall motion are normal.  Mild concentric left ventricular hypertrophy.  Visually estimated ejection fraction is 55 to 60%.  Grade 1A diastolic dysfunction.  Mild left atrial enlargement.  Right heart chambers are normal.  No septal defect or intracavitary mass or thrombus.     2.  Valves are morphologically normal with no stenotic lesions or valve associated masses or thrombi.  There is trivial MR and physiologic TR.     3.  No pericardial or great vessel pathology.     4.  Pulmonary artery pressures cannot be calculated.

## 2024-09-19 ENCOUNTER — OFFICE VISIT (OUTPATIENT)
Dept: CARDIOLOGY | Facility: CLINIC | Age: 64
End: 2024-09-19
Payer: MEDICARE

## 2024-09-19 VITALS
HEIGHT: 67 IN | DIASTOLIC BLOOD PRESSURE: 75 MMHG | WEIGHT: 214 LBS | SYSTOLIC BLOOD PRESSURE: 122 MMHG | BODY MASS INDEX: 33.59 KG/M2 | OXYGEN SATURATION: 96 % | HEART RATE: 77 BPM

## 2024-09-19 DIAGNOSIS — R06.09 DYSPNEA ON EXERTION: ICD-10-CM

## 2024-09-19 DIAGNOSIS — I10 PRIMARY HYPERTENSION: ICD-10-CM

## 2024-09-19 DIAGNOSIS — R07.2 PRECORDIAL PAIN: Primary | ICD-10-CM

## 2024-09-19 PROCEDURE — 1159F MED LIST DOCD IN RCRD: CPT | Performed by: PHYSICIAN ASSISTANT

## 2024-09-19 PROCEDURE — 99213 OFFICE O/P EST LOW 20 MIN: CPT | Performed by: PHYSICIAN ASSISTANT

## 2024-09-19 PROCEDURE — 1160F RVW MEDS BY RX/DR IN RCRD: CPT | Performed by: PHYSICIAN ASSISTANT

## (undated) DEVICE — PK NEURO DISC 10

## (undated) DEVICE — ANTIBACTERIAL UNDYED BRAIDED (POLYGLACTIN 910), SYNTHETIC ABSORBABLE SUTURE: Brand: COATED VICRYL

## (undated) DEVICE — STRAP POSTN KN/BDY FM 5X72IN DISP

## (undated) DEVICE — CABL BIPOL MEGADYNE 12FT DISP

## (undated) DEVICE — C-ARM DRAPE: Brand: DEROYAL

## (undated) DEVICE — ACCY PA700 LUBRICANT DIFFUSER MR7 4 PACK: Brand: MIDAS REX

## (undated) DEVICE — ELECTRD BLD EZ CLN STD 2.5IN

## (undated) DEVICE — PATIENT RETURN ELECTRODE, SINGLE-USE, CONTACT QUALITY MONITORING, ADULT, WITH 9FT CORD, FOR PATIENTS WEIGING OVER 33LBS. (15KG): Brand: MEGADYNE

## (undated) DEVICE — SUT VIC PLS CTD ANTIB BR 3/0 8/18IN 45CM

## (undated) DEVICE — HDRST INTUB GENTLETOUCH SLOT 7IN RT

## (undated) DEVICE — GLV SURG PREMIERPRO MIC LTX PF SZ7.5 BRN

## (undated) DEVICE — TOOL 14MH30D LEGEND 14CM 3MM MH DIAM: Brand: MIDAS REX ™

## (undated) DEVICE — ADHS SKIN PREMIERPRO EXOFIN TOPICAL HI/VISC .5ML

## (undated) DEVICE — DRSNG WND BORDR/ADHS NONADHR/GZ LF 4X4IN STRL

## (undated) DEVICE — PENCL ROCKRSWCH MEGADYNE W/HOLSTR 10FT SS

## (undated) DEVICE — ELECTRD BLD EZ CLN MOD 4IN

## (undated) DEVICE — GLV SURG PREMIERPRO MIC LTX PF SZ6.5 BRN